# Patient Record
Sex: FEMALE | Race: OTHER | NOT HISPANIC OR LATINO
[De-identification: names, ages, dates, MRNs, and addresses within clinical notes are randomized per-mention and may not be internally consistent; named-entity substitution may affect disease eponyms.]

---

## 2021-10-13 ENCOUNTER — APPOINTMENT (OUTPATIENT)
Dept: SURGICAL ONCOLOGY | Facility: CLINIC | Age: 56
End: 2021-10-13
Payer: MEDICAID

## 2021-10-13 VITALS
HEIGHT: 62 IN | BODY MASS INDEX: 21.97 KG/M2 | DIASTOLIC BLOOD PRESSURE: 89 MMHG | OXYGEN SATURATION: 98 % | WEIGHT: 119.4 LBS | RESPIRATION RATE: 18 BRPM | SYSTOLIC BLOOD PRESSURE: 144 MMHG | HEART RATE: 78 BPM | TEMPERATURE: 98.2 F

## 2021-10-13 PROCEDURE — 99204 OFFICE O/P NEW MOD 45 MIN: CPT

## 2021-10-14 NOTE — ASSESSMENT
[FreeTextEntry1] : IMP: 55 year old female present with a enlarged heterogenous thyroid gland. Left thyroid biopsy- positive for malignancy. papillary thyroid carcinoma  \par \par PLAN: \par -CT neck to evaluate for cervical lymphadenopathy\par - Plan for total thyroidectomy given the presence of significant thyroid nodules on the contralateral side. pt prefers a total thyroidectomy and doesn’t want to consider a thyroid lobectomy.\par I have discussed the risks, benefits, alternatives, complications including but not limited bleeding, infection, damage to adjacent structures, nerve injury, hypoparathyroidism, sepsis, need for further procedures, sepsis, tumor recurrence to the patient in detail. Patient expressed verbal understanding. Written informed consent to be obtained in the preoperative period \par \par I have discussed the diagnosis, therapeutic plan and options with the patient at length. Patient expressed verbal understanding to proceed with proposed plan. All questions answered. \par

## 2021-10-14 NOTE — REASON FOR VISIT
[Initial Consultation] : an initial consultation for [Thyroid Nodule] : thyroid nodule [FreeTextEntry2] : papillary thyroid carcinoma

## 2021-10-14 NOTE — PHYSICAL EXAM
[Normal Neck Lymph Nodes] : normal neck lymph nodes  [Normal Supraclavicular Lymph Nodes] : normal supraclavicular lymph nodes [Normal Axillary Lymph Nodes] : normal axillary lymph nodes [Normal] : oriented to person, place and time, with appropriate affect [FreeTextEntry1] : COVID-19 precautions as per [facility] policy was universally followed  [de-identified] :  thyromegaly , left more than right

## 2021-10-14 NOTE — HISTORY OF PRESENT ILLNESS
[de-identified] : Ms. CARLOZ YING is a 55 year old female who present today for initial consultation for papillary thyroid carcinoma. Referred by: Dr. Daniel Yeung. \par Patient states she had gone for her annual physical exam and complaint to her PCP she had left neck pain and swelling and head pressure and PCP ordered a left neck US, which showed enlarged heterogenous thyroid gland. \par Past medical history: Menopause, Anemia(Thalassemia)  \par Family History: Breast cancer in sister at age 45\par Per patient had colonoscopy at age 49- per patient normal \par \par Thyroid US 9/4/21: Enlarged heterogenous thyroid gland with 3 noduled in each lobe largest right 1.1 cm and left 3 cm, TI-RADS 4.Biopsy of the dominant left-sided nodule is recommended and f/u \par \par Thyroid gland, left lobe, upper pole biopsy 10/8/21: Positive for malignancy. Papillary thyroid carcinoma. \par \par PCP: Dr. Daniel Yeung.

## 2021-10-14 NOTE — CONSULT LETTER
[Consult Letter:] : I had the pleasure of evaluating your patient, [unfilled]. [Please see my note below.] : Please see my note below. [Consult Closing:] : Thank you very much for allowing me to participate in the care of this patient.  If you have any questions, please do not hesitate to contact me. [Sincerely,] : Sincerely, [Dear  ___] : Dear  [unfilled], [( Thank you for referring [unfilled] for consultation for _____ )] : Thank you for referring [unfilled] for consultation for [unfilled] [FreeTextEntry3] : Markos Sotomayor MD, FICS, FACS\par , Surgical Oncology \par [facility] [place] [facility] \par [address]\[place] [address]\par \[place] [address]\par \par (mob) [phone]\par (o) [phone]\par (f) [phone]\par

## 2021-10-19 ENCOUNTER — RESULT REVIEW (OUTPATIENT)
Age: 56
End: 2021-10-19

## 2021-10-22 ENCOUNTER — OUTPATIENT (OUTPATIENT)
Dept: OUTPATIENT SERVICES | Facility: HOSPITAL | Age: 56
LOS: 1 days | End: 2021-10-22
Payer: MEDICAID

## 2021-10-22 ENCOUNTER — APPOINTMENT (OUTPATIENT)
Dept: CT IMAGING | Facility: CLINIC | Age: 56
End: 2021-10-22
Payer: MEDICAID

## 2021-10-22 DIAGNOSIS — C73 MALIGNANT NEOPLASM OF THYROID GLAND: ICD-10-CM

## 2021-10-22 PROCEDURE — 70491 CT SOFT TISSUE NECK W/DYE: CPT | Mod: 26

## 2021-10-22 PROCEDURE — 70491 CT SOFT TISSUE NECK W/DYE: CPT

## 2021-10-26 ENCOUNTER — TRANSCRIPTION ENCOUNTER (OUTPATIENT)
Age: 56
End: 2021-10-26

## 2021-10-27 ENCOUNTER — OUTPATIENT (OUTPATIENT)
Dept: OUTPATIENT SERVICES | Facility: HOSPITAL | Age: 56
LOS: 1 days | End: 2021-10-27
Payer: MEDICAID

## 2021-10-27 VITALS
HEART RATE: 66 BPM | HEIGHT: 63 IN | WEIGHT: 119.93 LBS | SYSTOLIC BLOOD PRESSURE: 124 MMHG | OXYGEN SATURATION: 100 % | RESPIRATION RATE: 16 BRPM | TEMPERATURE: 98 F | DIASTOLIC BLOOD PRESSURE: 73 MMHG

## 2021-10-27 DIAGNOSIS — Z01.818 ENCOUNTER FOR OTHER PREPROCEDURAL EXAMINATION: ICD-10-CM

## 2021-10-27 DIAGNOSIS — C73 MALIGNANT NEOPLASM OF THYROID GLAND: ICD-10-CM

## 2021-10-27 DIAGNOSIS — Z98.891 HISTORY OF UTERINE SCAR FROM PREVIOUS SURGERY: Chronic | ICD-10-CM

## 2021-10-27 LAB
ALBUMIN SERPL ELPH-MCNC: 4.1 G/DL — SIGNIFICANT CHANGE UP (ref 3.5–5)
ALP SERPL-CCNC: 61 U/L — SIGNIFICANT CHANGE UP (ref 40–120)
ALT FLD-CCNC: 25 U/L DA — SIGNIFICANT CHANGE UP (ref 10–60)
ANION GAP SERPL CALC-SCNC: 7 MMOL/L — SIGNIFICANT CHANGE UP (ref 5–17)
AST SERPL-CCNC: 15 U/L — SIGNIFICANT CHANGE UP (ref 10–40)
BILIRUB SERPL-MCNC: 1.1 MG/DL — SIGNIFICANT CHANGE UP (ref 0.2–1.2)
BLD GP AB SCN SERPL QL: SIGNIFICANT CHANGE UP
BUN SERPL-MCNC: 14 MG/DL — SIGNIFICANT CHANGE UP (ref 7–18)
CALCIUM SERPL-MCNC: 9.6 MG/DL — SIGNIFICANT CHANGE UP (ref 8.4–10.5)
CHLORIDE SERPL-SCNC: 105 MMOL/L — SIGNIFICANT CHANGE UP (ref 96–108)
CO2 SERPL-SCNC: 27 MMOL/L — SIGNIFICANT CHANGE UP (ref 22–31)
CREAT SERPL-MCNC: 0.71 MG/DL — SIGNIFICANT CHANGE UP (ref 0.5–1.3)
GLUCOSE SERPL-MCNC: 85 MG/DL — SIGNIFICANT CHANGE UP (ref 70–99)
HCT VFR BLD CALC: 36.6 % — SIGNIFICANT CHANGE UP (ref 34.5–45)
HGB BLD-MCNC: 11.4 G/DL — LOW (ref 11.5–15.5)
INR BLD: 1.18 RATIO — HIGH (ref 0.88–1.16)
MCHC RBC-ENTMCNC: 18.8 PG — LOW (ref 27–34)
MCHC RBC-ENTMCNC: 31.1 GM/DL — LOW (ref 32–36)
MCV RBC AUTO: 60.5 FL — LOW (ref 80–100)
NRBC # BLD: 0 /100 WBCS — SIGNIFICANT CHANGE UP (ref 0–0)
PLATELET # BLD AUTO: 172 K/UL — SIGNIFICANT CHANGE UP (ref 150–400)
POTASSIUM SERPL-MCNC: 4.1 MMOL/L — SIGNIFICANT CHANGE UP (ref 3.5–5.3)
POTASSIUM SERPL-SCNC: 4.1 MMOL/L — SIGNIFICANT CHANGE UP (ref 3.5–5.3)
PROT SERPL-MCNC: 8 G/DL — SIGNIFICANT CHANGE UP (ref 6–8.3)
PROTHROM AB SERPL-ACNC: 13.9 SEC — HIGH (ref 10.6–13.6)
RBC # BLD: 6.05 M/UL — HIGH (ref 3.8–5.2)
RBC # FLD: 16 % — HIGH (ref 10.3–14.5)
SODIUM SERPL-SCNC: 139 MMOL/L — SIGNIFICANT CHANGE UP (ref 135–145)
T4 AB SER-ACNC: 8.4 UG/DL — SIGNIFICANT CHANGE UP (ref 4.6–12)
TSH SERPL-MCNC: 1.28 UU/ML — SIGNIFICANT CHANGE UP (ref 0.34–4.82)
WBC # BLD: 5.05 K/UL — SIGNIFICANT CHANGE UP (ref 3.8–10.5)
WBC # FLD AUTO: 5.05 K/UL — SIGNIFICANT CHANGE UP (ref 3.8–10.5)

## 2021-10-27 PROCEDURE — G0463: CPT

## 2021-10-27 PROCEDURE — 84480 ASSAY TRIIODOTHYRONINE (T3): CPT

## 2021-10-27 PROCEDURE — 71046 X-RAY EXAM CHEST 2 VIEWS: CPT | Mod: 26

## 2021-10-27 PROCEDURE — 71046 X-RAY EXAM CHEST 2 VIEWS: CPT

## 2021-10-27 NOTE — H&P PST ADULT - MAMMOGRAM, RESULTS OF LAST, PROFILE
[General Appearance - Well Developed] : well developed [Normal Appearance] : normal appearance [Well Groomed] : well groomed [General Appearance - Well Nourished] : well nourished [No Deformities] : no deformities [General Appearance - In No Acute Distress] : no acute distress [Normal Conjunctiva] : the conjunctiva exhibited no abnormalities [Eyelids - No Xanthelasma] : the eyelids demonstrated no xanthelasmas [Normal Oral Mucosa] : normal oral mucosa [No Oral Pallor] : no oral pallor [No Oral Cyanosis] : no oral cyanosis [Normal Jugular Venous A Waves Present] : normal jugular venous A waves present [Normal Jugular Venous V Waves Present] : normal jugular venous V waves present [No Jugular Venous Almaguer A Waves] : no jugular venous almaguer A waves [Respiration, Rhythm And Depth] : normal respiratory rhythm and effort [Exaggerated Use Of Accessory Muscles For Inspiration] : no accessory muscle use [Auscultation Breath Sounds / Voice Sounds] : lungs were clear to auscultation bilaterally [Heart Rate And Rhythm] : heart rate and rhythm were normal [Heart Sounds] : normal S1 and S2 [Murmurs] : no murmurs present [Abdomen Soft] : soft [Abdomen Tenderness] : non-tender [Abdomen Mass (___ Cm)] : no abdominal mass palpated [Abnormal Walk] : normal gait [Gait - Sufficient For Exercise Testing] : the gait was sufficient for exercise testing [Nail Clubbing] : no clubbing of the fingernails [Cyanosis, Localized] : no localized cyanosis [Petechial Hemorrhages (___cm)] : no petechial hemorrhages [Skin Color & Pigmentation] : normal skin color and pigmentation [] : no rash [No Venous Stasis] : no venous stasis [Skin Lesions] : no skin lesions [No Skin Ulcers] : no skin ulcer [No Xanthoma] : no  xanthoma was observed [Oriented To Time, Place, And Person] : oriented to person, place, and time [Affect] : the affect was normal [Mood] : the mood was normal [No Anxiety] : not feeling anxious [Arterial Pulses Normal] : the arterial pulses were normal [Edema] : no peripheral edema present Negative for malignancy

## 2021-10-27 NOTE — H&P PST ADULT - NSICDXPASTSURGICALHX_GEN_ALL_CORE_FT
PAST SURGICAL HISTORY:    right foot surgery with hardware/Arthropathy      b/l shoulder surgery/Arthropathy Bilateral rotator cuff repair    History of

## 2021-10-27 NOTE — H&P PST ADULT - PROBLEM SELECTOR PLAN 1
Patient scheduled for total thyroidectomy on 11/15/2021  - Written and oral preoperative instructions given to patient with understanding verbalized.   - Instructions given to include using 4% chlorhexidine wash day of surgery.   - Maintaining NPO status post midnight day before surgery  - Stopping aspirin, NSAIDs, herbs, vitamins 7days before surgery   - Patient is to expect a phone call day before surgery between the hours of 430- 630pm giving arrival time for surgery day.  Preoperative labs drawn today - cbc, bmp, ptt, pt, inr, type/screen, thyroid panel. Results pending   CXR ordered to be done today - results pending. All testing to be faxed to PCP for preoperative medical optimization

## 2021-10-27 NOTE — H&P PST ADULT - NSICDXFAMILYHX_GEN_ALL_CORE_FT
FAMILY HISTORY:  Family history of breast cancer  Family history of diabetes mellitus  Family hx of hypertension

## 2021-10-27 NOTE — H&P PST ADULT - NSICDXPASTMEDICALHX_GEN_ALL_CORE_FT
PAST MEDICAL HISTORY:  2010  'uterine Cyst'     Anemia Thalassemia    Calcaneal fracture     History of rotator cuff tear bilateral

## 2021-10-27 NOTE — H&P PST ADULT - HISTORY OF PRESENT ILLNESS
55year old female with pmhx of Right calcaneal fracture, thalassemia and bilateral rotator cuff tear presents with c/o left side pain and stiffness when she was referred to have neck ultrasound revealing malignancy confirmed by biopsy. Patient is here today for presurgical testing for scheduled Total Thyroidectomy in 11/15/2021

## 2021-10-27 NOTE — H&P PST ADULT - NSANTHOSAYNRD_GEN_A_CORE
Patient denies hx/dx of ANITA/No. ANITA screening performed.  STOP BANG Legend: 0-2 = LOW Risk; 3-4 = INTERMEDIATE Risk; 5-8 = HIGH Risk

## 2021-10-28 ENCOUNTER — OUTPATIENT (OUTPATIENT)
Dept: OUTPATIENT SERVICES | Facility: HOSPITAL | Age: 56
LOS: 1 days | End: 2021-10-28

## 2021-10-28 DIAGNOSIS — Z98.891 HISTORY OF UTERINE SCAR FROM PREVIOUS SURGERY: Chronic | ICD-10-CM

## 2021-10-28 DIAGNOSIS — C73 MALIGNANT NEOPLASM OF THYROID GLAND: ICD-10-CM

## 2021-10-28 LAB — T3 SERPL-MCNC: 119 NG/DL — SIGNIFICANT CHANGE UP (ref 80–200)

## 2021-10-29 ENCOUNTER — RESULT REVIEW (OUTPATIENT)
Age: 56
End: 2021-10-29

## 2021-10-29 PROBLEM — Z87.39 PERSONAL HISTORY OF OTHER DISEASES OF THE MUSCULOSKELETAL SYSTEM AND CONNECTIVE TISSUE: Chronic | Status: ACTIVE | Noted: 2021-10-27

## 2021-10-29 PROBLEM — S92.009A UNSPECIFIED FRACTURE OF UNSPECIFIED CALCANEUS, INITIAL ENCOUNTER FOR CLOSED FRACTURE: Chronic | Status: ACTIVE | Noted: 2021-10-27

## 2021-11-06 DIAGNOSIS — Z01.818 ENCOUNTER FOR OTHER PREPROCEDURAL EXAMINATION: ICD-10-CM

## 2021-11-12 ENCOUNTER — APPOINTMENT (OUTPATIENT)
Dept: DISASTER EMERGENCY | Facility: CLINIC | Age: 56
End: 2021-11-12

## 2021-11-13 LAB — SARS-COV-2 N GENE NPH QL NAA+PROBE: NOT DETECTED

## 2021-11-14 ENCOUNTER — TRANSCRIPTION ENCOUNTER (OUTPATIENT)
Age: 56
End: 2021-11-14

## 2021-11-15 ENCOUNTER — APPOINTMENT (OUTPATIENT)
Dept: SURGICAL ONCOLOGY | Facility: HOSPITAL | Age: 56
End: 2021-11-15

## 2021-11-15 ENCOUNTER — RESULT REVIEW (OUTPATIENT)
Age: 56
End: 2021-11-15

## 2021-11-15 ENCOUNTER — INPATIENT (INPATIENT)
Facility: HOSPITAL | Age: 56
LOS: 0 days | Discharge: HOME | DRG: 627 | End: 2021-11-16
Attending: SURGERY | Admitting: SURGERY
Payer: MEDICAID

## 2021-11-15 ENCOUNTER — TRANSCRIPTION ENCOUNTER (OUTPATIENT)
Age: 56
End: 2021-11-15

## 2021-11-15 VITALS
HEIGHT: 63 IN | SYSTOLIC BLOOD PRESSURE: 137 MMHG | TEMPERATURE: 98 F | WEIGHT: 119.93 LBS | RESPIRATION RATE: 17 BRPM | HEART RATE: 77 BPM | DIASTOLIC BLOOD PRESSURE: 70 MMHG | OXYGEN SATURATION: 100 %

## 2021-11-15 DIAGNOSIS — Z01.818 ENCOUNTER FOR OTHER PREPROCEDURAL EXAMINATION: ICD-10-CM

## 2021-11-15 DIAGNOSIS — Z98.891 HISTORY OF UTERINE SCAR FROM PREVIOUS SURGERY: Chronic | ICD-10-CM

## 2021-11-15 DIAGNOSIS — C73 MALIGNANT NEOPLASM OF THYROID GLAND: ICD-10-CM

## 2021-11-15 LAB — BLD GP AB SCN SERPL QL: SIGNIFICANT CHANGE UP

## 2021-11-15 PROCEDURE — 60240 REMOVAL OF THYROID: CPT | Mod: 82

## 2021-11-15 PROCEDURE — 60240 REMOVAL OF THYROID: CPT

## 2021-11-15 PROCEDURE — 88305 TISSUE EXAM BY PATHOLOGIST: CPT | Mod: 26

## 2021-11-15 PROCEDURE — 88307 TISSUE EXAM BY PATHOLOGIST: CPT | Mod: 26

## 2021-11-15 RX ORDER — CALCITRIOL 0.5 UG/1
1 CAPSULE ORAL
Qty: 20 | Refills: 0
Start: 2021-11-15 | End: 2021-11-24

## 2021-11-15 RX ORDER — ONDANSETRON 8 MG/1
4 TABLET, FILM COATED ORAL EVERY 6 HOURS
Refills: 0 | Status: DISCONTINUED | OUTPATIENT
Start: 2021-11-15 | End: 2021-11-16

## 2021-11-15 RX ORDER — SODIUM CHLORIDE 9 MG/ML
1000 INJECTION, SOLUTION INTRAVENOUS
Refills: 0 | Status: DISCONTINUED | OUTPATIENT
Start: 2021-11-15 | End: 2021-11-15

## 2021-11-15 RX ORDER — METOCLOPRAMIDE HCL 10 MG
10 TABLET ORAL ONCE
Refills: 0 | Status: DISCONTINUED | OUTPATIENT
Start: 2021-11-15 | End: 2021-11-15

## 2021-11-15 RX ORDER — ACETAMINOPHEN 500 MG
1000 TABLET ORAL ONCE
Refills: 0 | Status: DISCONTINUED | OUTPATIENT
Start: 2021-11-15 | End: 2021-11-16

## 2021-11-15 RX ORDER — LEVOTHYROXINE SODIUM 125 MCG
1 TABLET ORAL
Qty: 30 | Refills: 2
Start: 2021-11-15 | End: 2022-02-12

## 2021-11-15 RX ORDER — ACETAMINOPHEN 500 MG
1000 TABLET ORAL ONCE
Refills: 0 | Status: DISCONTINUED | OUTPATIENT
Start: 2021-11-15 | End: 2021-11-15

## 2021-11-15 RX ORDER — ONDANSETRON 8 MG/1
4 TABLET, FILM COATED ORAL EVERY 6 HOURS
Refills: 0 | Status: DISCONTINUED | OUTPATIENT
Start: 2021-11-15 | End: 2021-11-15

## 2021-11-15 RX ORDER — SODIUM CHLORIDE 9 MG/ML
3 INJECTION INTRAMUSCULAR; INTRAVENOUS; SUBCUTANEOUS EVERY 8 HOURS
Refills: 0 | Status: DISCONTINUED | OUTPATIENT
Start: 2021-11-15 | End: 2021-11-15

## 2021-11-15 RX ORDER — CALCIUM ACETATE 667 MG
667 TABLET ORAL EVERY 12 HOURS
Refills: 0 | Status: DISCONTINUED | OUTPATIENT
Start: 2021-11-15 | End: 2021-11-16

## 2021-11-15 RX ORDER — SODIUM CHLORIDE 9 MG/ML
1000 INJECTION, SOLUTION INTRAVENOUS
Refills: 0 | Status: DISCONTINUED | OUTPATIENT
Start: 2021-11-15 | End: 2021-11-16

## 2021-11-15 RX ORDER — LEVOTHYROXINE SODIUM 125 MCG
100 TABLET ORAL DAILY
Refills: 0 | Status: DISCONTINUED | OUTPATIENT
Start: 2021-11-15 | End: 2021-11-15

## 2021-11-15 RX ORDER — LEVOTHYROXINE SODIUM 125 MCG
100 TABLET ORAL DAILY
Refills: 0 | Status: DISCONTINUED | OUTPATIENT
Start: 2021-11-15 | End: 2021-11-16

## 2021-11-15 RX ORDER — FENTANYL CITRATE 50 UG/ML
50 INJECTION INTRAVENOUS
Refills: 0 | Status: DISCONTINUED | OUTPATIENT
Start: 2021-11-15 | End: 2021-11-15

## 2021-11-15 RX ORDER — CALCITRIOL 0.5 UG/1
0.25 CAPSULE ORAL EVERY 12 HOURS
Refills: 0 | Status: DISCONTINUED | OUTPATIENT
Start: 2021-11-15 | End: 2021-11-16

## 2021-11-15 RX ORDER — FENTANYL CITRATE 50 UG/ML
25 INJECTION INTRAVENOUS
Refills: 0 | Status: DISCONTINUED | OUTPATIENT
Start: 2021-11-15 | End: 2021-11-15

## 2021-11-15 RX ORDER — ACETAMINOPHEN 500 MG
650 TABLET ORAL EVERY 6 HOURS
Refills: 0 | Status: DISCONTINUED | OUTPATIENT
Start: 2021-11-15 | End: 2021-11-16

## 2021-11-15 RX ORDER — CALCIUM ACETATE 667 MG
667 TABLET ORAL
Refills: 0 | Status: DISCONTINUED | OUTPATIENT
Start: 2021-11-15 | End: 2021-11-15

## 2021-11-15 RX ORDER — CALCITRIOL 0.5 UG/1
0.25 CAPSULE ORAL
Refills: 0 | Status: DISCONTINUED | OUTPATIENT
Start: 2021-11-15 | End: 2021-11-15

## 2021-11-15 RX ADMIN — Medication 667 MILLIGRAM(S): at 17:37

## 2021-11-15 RX ADMIN — Medication 650 MILLIGRAM(S): at 22:17

## 2021-11-15 RX ADMIN — Medication 650 MILLIGRAM(S): at 21:17

## 2021-11-15 RX ADMIN — Medication 100 MICROGRAM(S): at 17:38

## 2021-11-15 RX ADMIN — CALCITRIOL 0.25 MICROGRAM(S): 0.5 CAPSULE ORAL at 17:37

## 2021-11-15 NOTE — DISCHARGE NOTE PROVIDER - HOSPITAL COURSE
55 Y M PMH of right calcaneal fracture, thalassemia, and b/l rotator cuff tear admitted to the surgery service s/p total thyroidectomy for papillary thyroid cancer. Patient did well post operatively, pain well controlled and tolerating diet.

## 2021-11-15 NOTE — DISCHARGE NOTE PROVIDER - CARE PROVIDER_API CALL
Markos Maciel)  Surgery  [address], Division [place] [address]  Phone: [phone]  Fax: [phone]  Follow Up Time:

## 2021-11-15 NOTE — DISCHARGE NOTE PROVIDER - NSDCMRMEDTOKEN_GEN_ALL_CORE_FT
calcitriol 0.25 mcg oral capsule: 1 cap(s) orally 2 times a day   Calcium 600+D oral tablet: 1 tab(s) orally 2 times a day   levothyroxine 100 mcg (0.1 mg) oral tablet: 1 tab(s) orally once a day    acetaminophen 325 mg oral tablet: 2 tab(s) orally every 6 hours, As needed, Temp greater or equal to 38C (100.4F), Mild Pain (1 - 3)  calcitriol 0.25 mcg oral capsule: 1 cap(s) orally 2 times a day   Calcium 600+D oral tablet: 1 tab(s) orally 2 times a day   levothyroxine 100 mcg (0.1 mg) oral tablet: 1 tab(s) orally once a day

## 2021-11-15 NOTE — BRIEF OPERATIVE NOTE - OPERATION/FINDINGS
Normal thyroid anatomy, bilateral recurrent laryngeal nerves identified, bilateral superior and inferior parathyroid glands identified and preserved. One small normal appearing lymph node on right sent for specimen.

## 2021-11-15 NOTE — DISCHARGE NOTE PROVIDER - NSDCFUADDINST_GEN_ALL_CORE_FT
You had a total thyroidectomy performed. For pain take over the counter Tylenol ever 6 hours as needed.  You may shower, allow soap and water to rinse down your incision, do not scrub the area. The following medications were sent to your pharmacy, please take as prescribed:  Calcitriol 0.25 mcg every 12 hours for 10 days, Calcium 600 + D every 12 hours for 10 days, and synthroid 100 mcg daily.

## 2021-11-16 ENCOUNTER — TRANSCRIPTION ENCOUNTER (OUTPATIENT)
Age: 56
End: 2021-11-16

## 2021-11-16 VITALS
DIASTOLIC BLOOD PRESSURE: 65 MMHG | HEART RATE: 84 BPM | OXYGEN SATURATION: 100 % | SYSTOLIC BLOOD PRESSURE: 114 MMHG | TEMPERATURE: 99 F | RESPIRATION RATE: 16 BRPM

## 2021-11-16 LAB
COVID-19 NUCLEOCAPSID GAM AB INTERP: POSITIVE
COVID-19 NUCLEOCAPSID TOTAL GAM ANTIBODY RESULT: 144 INDEX — HIGH
COVID-19 SPIKE DOMAIN AB INTERP: POSITIVE
COVID-19 SPIKE DOMAIN ANTIBODY RESULT: >250 U/ML — HIGH
SARS-COV-2 IGG+IGM SERPL QL IA: 144 INDEX — HIGH
SARS-COV-2 IGG+IGM SERPL QL IA: >250 U/ML — HIGH
SARS-COV-2 IGG+IGM SERPL QL IA: POSITIVE
SARS-COV-2 IGG+IGM SERPL QL IA: POSITIVE

## 2021-11-16 PROCEDURE — 88307 TISSUE EXAM BY PATHOLOGIST: CPT

## 2021-11-16 PROCEDURE — 86901 BLOOD TYPING SEROLOGIC RH(D): CPT

## 2021-11-16 PROCEDURE — C1889: CPT

## 2021-11-16 PROCEDURE — 86769 SARS-COV-2 COVID-19 ANTIBODY: CPT

## 2021-11-16 PROCEDURE — 86900 BLOOD TYPING SEROLOGIC ABO: CPT

## 2021-11-16 PROCEDURE — 99238 HOSP IP/OBS DSCHRG MGMT 30/<: CPT | Mod: 24

## 2021-11-16 PROCEDURE — 88305 TISSUE EXAM BY PATHOLOGIST: CPT

## 2021-11-16 PROCEDURE — 86850 RBC ANTIBODY SCREEN: CPT

## 2021-11-16 PROCEDURE — 36415 COLL VENOUS BLD VENIPUNCTURE: CPT

## 2021-11-16 RX ORDER — ACETAMINOPHEN 500 MG
2 TABLET ORAL
Qty: 0 | Refills: 0 | DISCHARGE
Start: 2021-11-16

## 2021-11-16 RX ADMIN — CALCITRIOL 0.25 MICROGRAM(S): 0.5 CAPSULE ORAL at 05:45

## 2021-11-16 RX ADMIN — Medication 667 MILLIGRAM(S): at 05:45

## 2021-11-16 RX ADMIN — Medication 100 MICROGRAM(S): at 05:45

## 2021-11-16 NOTE — PROGRESS NOTE ADULT - SUBJECTIVE AND OBJECTIVE BOX
55 Y F PMH of right calcaneal fracture, thalassemia, and b/l rotator cuff tear admitted to the surgery service POD # 1 for  total thyroidectomy for papillary thyroid cancer.     No acute events overnight, patient has been hemodynamically stable. Has appropriate pain around surgical site. Denies any nausea or vomiting, tolerating diet. Pt. endorses she had some tingling in her calfs overnight but went away with placement of SCD. Pt. denies any persistent numbness/tingling/muscle spasms.      Vital Signs Last 24 Hrs  T(C): 36.7 (16 Nov 2021 06:28), Max: 37.1 (15 Nov 2021 23:36)  T(F): 98.1 (16 Nov 2021 06:28), Max: 98.8 (15 Nov 2021 23:36)  HR: 77 (16 Nov 2021 06:28) (68 - 100)  BP: 100/55 (16 Nov 2021 06:28) (99/45 - 119/71)  BP(mean): 83 (15 Nov 2021 12:30) (82 - 89)  RR: 16 (16 Nov 2021 06:28) (15 - 18)  SpO2: 100% (16 Nov 2021 06:28) (95% - 100%)    No acute distress, AAOX3   neck incisional site, healing well, dressing dry and intact, no swelling or erythema    non labored breathing, saturating well on room air   abd is soft, non tender, non distended, +BS   full ROM x 4       
Vital Signs Last 24 Hrs  T(C): 36.7 (16 Nov 2021 06:28), Max: 37.1 (15 Nov 2021 23:36)  T(F): 98.1 (16 Nov 2021 06:28), Max: 98.8 (15 Nov 2021 23:36)  HR: 77 (16 Nov 2021 06:28) (68 - 100)  BP: 100/55 (16 Nov 2021 06:28) (99/45 - 119/71)  BP(mean): 83 (15 Nov 2021 12:30) (82 - 89)  RR: 16 (16 Nov 2021 06:28) (15 - 18)  SpO2: 100% (16 Nov 2021 06:28) (95% - 100%)    I&O's Detail  incision clear  negative chvostek                      PLAN:  discharge home today on synthroid, rocaltrol and calcium        
Post op check      55 Y F PMH of right calcaneal fracture, thalassemia, and b/l rotator cuff tear admitted to the surgery service POD # 0 for  total thyroidectomy for papillary thyroid cancer. Patient doing well, complaining of some soreness in the throat, otherwise pain is well controlled, tolerating liquid diet, denies any nausea or vomiting.     Vital Signs Last 24 Hrs  T(C): 36.6 (15 Nov 2021 14:31), Max: 36.9 (15 Nov 2021 06:36)  T(F): 97.9 (15 Nov 2021 14:31), Max: 98.4 (15 Nov 2021 06:36)  HR: 94 (15 Nov 2021 14:31) (72 - 100)  BP: 112/73 (15 Nov 2021 14:31) (111/70 - 137/70)  BP(mean): 83 (15 Nov 2021 12:30) (82 - 89)  RR: 18 (15 Nov 2021 14:31) (15 - 18)  SpO2: 95% (15 Nov 2021 14:31) (95% - 100%)     No acute distress, AAOX3   Neck incisional surgical dressing dry and intact, raspy phonation   non labored breathing saturating well on room air  Full ROM x 4   skin well perfused      A/P:  55 Y F POD # 0 s/p total thyroidectomy   -advance to regular diet  -continue current pain regimen   -monitor for symptoms of hypocalcemia   -dispo likely tomorrow

## 2021-11-16 NOTE — DISCHARGE NOTE NURSING/CASE MANAGEMENT/SOCIAL WORK - PATIENT PORTAL LINK FT
You can access the FollowMyHealth Patient Portal offered by [facility] by registering at the following website: [email or web address]. By joining [employer]’s FollowMyHealth portal, you will also be able to view your health information using other applications (apps) compatible with our system.

## 2021-11-16 NOTE — PROGRESS NOTE ADULT - ASSESSMENT
55 Y F POD # 1 total thyroidectomy  -patient doing well post operatively  -continue current pain regiment  -OOBTC and ambulation   -incentive spirometry  -likely dispo home today

## 2021-12-01 ENCOUNTER — APPOINTMENT (OUTPATIENT)
Dept: SURGICAL ONCOLOGY | Facility: CLINIC | Age: 56
End: 2021-12-01
Payer: MEDICAID

## 2021-12-01 VITALS
SYSTOLIC BLOOD PRESSURE: 113 MMHG | HEART RATE: 71 BPM | OXYGEN SATURATION: 99 % | RESPIRATION RATE: 18 BRPM | WEIGHT: 121.8 LBS | TEMPERATURE: 98.3 F | BODY MASS INDEX: 22.28 KG/M2 | DIASTOLIC BLOOD PRESSURE: 75 MMHG

## 2021-12-01 PROCEDURE — 99024 POSTOP FOLLOW-UP VISIT: CPT

## 2021-12-01 RX ORDER — ASPIRIN 325 MG
600-400 TABLET, DELAYED RELEASE (ENTERIC COATED) ORAL
Qty: 60 | Refills: 0 | Status: ACTIVE | COMMUNITY
Start: 2021-12-01 | End: 1900-01-01

## 2021-12-01 RX ORDER — CALCITRIOL 0.25 UG/1
0.25 CAPSULE, LIQUID FILLED ORAL
Qty: 60 | Refills: 0 | Status: ACTIVE | COMMUNITY
Start: 2021-12-01 | End: 1900-01-01

## 2021-12-01 NOTE — HISTORY OF PRESENT ILLNESS
[de-identified] : Ms. CARLOZ YING is a 56 year old female who present today for post op s/p total thyroidectomy on 11/15/21. Final Path \par 1. Right cervical lymph node excision: 1 lymph node negative for metastatic carcinoma \par 2. Total thyroidectomy: Multifocal papillary thyroid carcinoma, mpT2, bilateral; 2 lesions: left lobe: papillary carcinoma, classic type, size 2.5 cm, located less than 1 mm from the anterior thyroidectomy margin; Right lobe: papillary carcinoma, follicular variant, infiltrative, size 0.5 cm, located less than 1 mm from the posterior thyroidectomy margin; One perithyroid lymph node, negative for metastatic carcinoma. Lymphovascular invasion is not identified. Two unremarkable left parathyroid glands are identified. Nonneoplastic thyroid gland shows Hashimoto's thyroiditis and multinodular goiter. \par 3. Cervical lymph node, level 6; excision: One lymph node, negative for metastatic carcinoma. pN0a \par \par \par Initial consultation for papillary thyroid carcinoma. Referred by: Dr. Daniel Yeung. \par Patient states she had gone for her annual physical exam and complaint to her PCP she had left neck pain and swelling and head pressure and PCP ordered a left neck US, which showed enlarged heterogenous thyroid gland. \par Past medical history: Menopause, Anemia(Thalassemia)  \par Family History: Breast cancer in sister at age 45\par Per patient had colonoscopy at age 49- per patient normal \par \par Thyroid US 9/4/21: Enlarged heterogenous thyroid gland with 3 noduled in each lobe largest right 1.1 cm and left 3 cm, TI-RADS 4.Biopsy of the dominant left-sided nodule is recommended and f/u \par \par Thyroid gland, left lobe, upper pole biopsy 10/8/21: Positive for malignancy. Papillary thyroid carcinoma. \par \par Today 12/1/21: patient is complaint of some harshness, and tingling in fingers. Denies fever, chill, or pain. Patient states is tolerating diet \par \par PCP: Dr. Daniel Yeung.

## 2021-12-01 NOTE — PHYSICAL EXAM
[Normal] : supple, no neck mass and thyroid not enlarged [Normal Neck Lymph Nodes] : normal neck lymph nodes  [Normal Supraclavicular Lymph Nodes] : normal supraclavicular lymph nodes [Normal Groin Lymph Nodes] : normal groin lymph nodes [Normal Axillary Lymph Nodes] : normal axillary lymph nodes [Normal] : oriented to person, place and time, with appropriate affect [FreeTextEntry1] : COVID-19 precautions as per [facility] policy was universally followed  [de-identified] : incision healing well with no evidence of infection, seroma or hematoma

## 2021-12-01 NOTE — ASSESSMENT
[FreeTextEntry1] : IMP: 55 year old female s/p total thyroidectomy for multifocal papillary thyroid carcinoma\par \par PLAN: \par - BMP, phos, and calcium ionized ordered now - pt complains of tingling of finger tips- ?transient hypoparathyroidism- Patient advised to continue to take calcitriol and calcium f\par - On synthroid- will need to be titrated accordingly\par - referred to  -endocrinologist to discuss HERNÁNDEZ\par - RTO 6 months  \par I have discussed the diagnosis, therapeutic plan and options with the patient at length. Patient expressed verbal understanding to proceed with proposed plan. All questions answered. \par

## 2021-12-01 NOTE — CONSULT LETTER
[Please see my note below.] : Please see my note below. [Consult Closing:] : Thank you very much for allowing me to participate in the care of this patient.  If you have any questions, please do not hesitate to contact me. [Sincerely,] : Sincerely, [Dear  ___] : Dear  [unfilled], [Consult Letter:] : I had the pleasure of evaluating your patient, [unfilled]. [( Thank you for referring [unfilled] for consultation for _____ )] : Thank you for referring [unfilled] for consultation for [unfilled] [FreeTextEntry2] : Daniel Yeung  [FreeTextEntry3] : Markos Sotomayor MD, FICS, FACS\par , Surgical Oncology \par [facility] [place] [facility] [employer] [facility] \par [address]\[place] [address]\par \[place] [address]\par \par (mob) [phone]\par (o) [phone]\par (f) [phone]\par   [DrCatherine  ___] : Dr. HAYES

## 2022-01-10 ENCOUNTER — OUTPATIENT (OUTPATIENT)
Dept: OUTPATIENT SERVICES | Facility: HOSPITAL | Age: 57
LOS: 1 days | End: 2022-01-10

## 2022-01-10 ENCOUNTER — APPOINTMENT (OUTPATIENT)
Dept: NUCLEAR MEDICINE | Facility: HOSPITAL | Age: 57
End: 2022-01-10
Payer: MEDICAID

## 2022-01-10 DIAGNOSIS — C73 MALIGNANT NEOPLASM OF THYROID GLAND: ICD-10-CM

## 2022-01-10 DIAGNOSIS — Z98.891 HISTORY OF UTERINE SCAR FROM PREVIOUS SURGERY: Chronic | ICD-10-CM

## 2022-01-11 ENCOUNTER — APPOINTMENT (OUTPATIENT)
Dept: NUCLEAR MEDICINE | Facility: HOSPITAL | Age: 57
End: 2022-01-11

## 2022-01-12 ENCOUNTER — APPOINTMENT (OUTPATIENT)
Dept: NUCLEAR MEDICINE | Facility: HOSPITAL | Age: 57
End: 2022-01-12

## 2022-01-12 PROCEDURE — 79005 NUCLEAR RX ORAL ADMIN: CPT | Mod: 26

## 2022-01-19 ENCOUNTER — OUTPATIENT (OUTPATIENT)
Dept: OUTPATIENT SERVICES | Facility: HOSPITAL | Age: 57
LOS: 1 days | End: 2022-01-19

## 2022-01-19 ENCOUNTER — APPOINTMENT (OUTPATIENT)
Dept: NUCLEAR MEDICINE | Facility: HOSPITAL | Age: 57
End: 2022-01-19
Payer: MEDICAID

## 2022-01-19 DIAGNOSIS — Z98.891 HISTORY OF UTERINE SCAR FROM PREVIOUS SURGERY: Chronic | ICD-10-CM

## 2022-01-19 DIAGNOSIS — C73 MALIGNANT NEOPLASM OF THYROID GLAND: ICD-10-CM

## 2022-01-19 PROCEDURE — 78018 THYROID MET IMAGING BODY: CPT | Mod: 26

## 2022-06-07 ENCOUNTER — APPOINTMENT (OUTPATIENT)
Dept: ULTRASOUND IMAGING | Facility: HOSPITAL | Age: 57
End: 2022-06-07
Payer: MEDICAID

## 2022-06-07 ENCOUNTER — OUTPATIENT (OUTPATIENT)
Dept: OUTPATIENT SERVICES | Facility: HOSPITAL | Age: 57
LOS: 1 days | End: 2022-06-07
Payer: MEDICAID

## 2022-06-07 ENCOUNTER — APPOINTMENT (OUTPATIENT)
Dept: MAMMOGRAPHY | Facility: HOSPITAL | Age: 57
End: 2022-06-07
Payer: MEDICAID

## 2022-06-07 DIAGNOSIS — Z98.891 HISTORY OF UTERINE SCAR FROM PREVIOUS SURGERY: Chronic | ICD-10-CM

## 2022-06-07 DIAGNOSIS — Z12.31 ENCOUNTER FOR SCREENING MAMMOGRAM FOR MALIGNANT NEOPLASM OF BREAST: ICD-10-CM

## 2022-06-07 PROCEDURE — 77063 BREAST TOMOSYNTHESIS BI: CPT

## 2022-06-07 PROCEDURE — 76641 ULTRASOUND BREAST COMPLETE: CPT | Mod: 26,50

## 2022-06-07 PROCEDURE — 77063 BREAST TOMOSYNTHESIS BI: CPT | Mod: 26

## 2022-06-07 PROCEDURE — 77067 SCR MAMMO BI INCL CAD: CPT | Mod: 26

## 2022-06-07 PROCEDURE — 76641 ULTRASOUND BREAST COMPLETE: CPT

## 2022-06-07 PROCEDURE — 77067 SCR MAMMO BI INCL CAD: CPT

## 2022-08-09 ENCOUNTER — RESULT REVIEW (OUTPATIENT)
Age: 57
End: 2022-08-09

## 2022-08-09 ENCOUNTER — APPOINTMENT (OUTPATIENT)
Dept: MAMMOGRAPHY | Facility: HOSPITAL | Age: 57
End: 2022-08-09

## 2022-08-09 ENCOUNTER — OUTPATIENT (OUTPATIENT)
Dept: OUTPATIENT SERVICES | Facility: HOSPITAL | Age: 57
LOS: 1 days | End: 2022-08-09
Payer: MEDICAID

## 2022-08-09 DIAGNOSIS — D24.2 BENIGN NEOPLASM OF LEFT BREAST: ICD-10-CM

## 2022-08-09 DIAGNOSIS — Z98.891 HISTORY OF UTERINE SCAR FROM PREVIOUS SURGERY: Chronic | ICD-10-CM

## 2022-08-09 DIAGNOSIS — R92.8 OTHER ABNORMAL AND INCONCLUSIVE FINDINGS ON DIAGNOSTIC IMAGING OF BREAST: ICD-10-CM

## 2022-08-09 PROCEDURE — 76642 ULTRASOUND BREAST LIMITED: CPT | Mod: 26,RT

## 2022-08-09 PROCEDURE — 77065 DX MAMMO INCL CAD UNI: CPT

## 2022-08-09 PROCEDURE — G0279: CPT

## 2022-08-09 PROCEDURE — 76642 ULTRASOUND BREAST LIMITED: CPT

## 2022-08-09 PROCEDURE — 77065 DX MAMMO INCL CAD UNI: CPT | Mod: 26,RT

## 2022-08-09 PROCEDURE — G0279: CPT | Mod: 26

## 2022-08-20 ENCOUNTER — RESULT REVIEW (OUTPATIENT)
Age: 57
End: 2022-08-20

## 2022-08-21 ENCOUNTER — INPATIENT (INPATIENT)
Facility: HOSPITAL | Age: 57
LOS: 0 days | Discharge: HOME | DRG: 343 | End: 2022-08-22
Attending: STUDENT IN AN ORGANIZED HEALTH CARE EDUCATION/TRAINING PROGRAM | Admitting: STUDENT IN AN ORGANIZED HEALTH CARE EDUCATION/TRAINING PROGRAM
Payer: MEDICAID

## 2022-08-21 ENCOUNTER — TRANSCRIPTION ENCOUNTER (OUTPATIENT)
Age: 57
End: 2022-08-21

## 2022-08-21 VITALS
HEART RATE: 62 BPM | RESPIRATION RATE: 16 BRPM | DIASTOLIC BLOOD PRESSURE: 75 MMHG | OXYGEN SATURATION: 99 % | SYSTOLIC BLOOD PRESSURE: 125 MMHG | TEMPERATURE: 98 F | HEIGHT: 63 IN | WEIGHT: 117.95 LBS

## 2022-08-21 DIAGNOSIS — E89.0 POSTPROCEDURAL HYPOTHYROIDISM: Chronic | ICD-10-CM

## 2022-08-21 DIAGNOSIS — K37 UNSPECIFIED APPENDICITIS: ICD-10-CM

## 2022-08-21 DIAGNOSIS — Z98.891 HISTORY OF UTERINE SCAR FROM PREVIOUS SURGERY: Chronic | ICD-10-CM

## 2022-08-21 LAB
ALBUMIN SERPL ELPH-MCNC: 3.8 G/DL — SIGNIFICANT CHANGE UP (ref 3.5–5)
ALP SERPL-CCNC: 40 U/L — SIGNIFICANT CHANGE UP (ref 40–120)
ALT FLD-CCNC: 31 U/L DA — SIGNIFICANT CHANGE UP (ref 10–60)
ANION GAP SERPL CALC-SCNC: 7 MMOL/L — SIGNIFICANT CHANGE UP (ref 5–17)
APPEARANCE UR: CLEAR — SIGNIFICANT CHANGE UP
APTT BLD: 29.7 SEC — SIGNIFICANT CHANGE UP (ref 27.5–35.5)
AST SERPL-CCNC: 37 U/L — SIGNIFICANT CHANGE UP (ref 10–40)
BACTERIA # UR AUTO: ABNORMAL /HPF
BASOPHILS # BLD AUTO: 0.02 K/UL — SIGNIFICANT CHANGE UP (ref 0–0.2)
BASOPHILS NFR BLD AUTO: 0.2 % — SIGNIFICANT CHANGE UP (ref 0–2)
BILIRUB SERPL-MCNC: 2.1 MG/DL — HIGH (ref 0.2–1.2)
BILIRUB UR-MCNC: NEGATIVE — SIGNIFICANT CHANGE UP
BLD GP AB SCN SERPL QL: SIGNIFICANT CHANGE UP
BUN SERPL-MCNC: 12 MG/DL — SIGNIFICANT CHANGE UP (ref 7–18)
CALCIUM SERPL-MCNC: 7.7 MG/DL — LOW (ref 8.4–10.5)
CHLORIDE SERPL-SCNC: 103 MMOL/L — SIGNIFICANT CHANGE UP (ref 96–108)
CO2 SERPL-SCNC: 26 MMOL/L — SIGNIFICANT CHANGE UP (ref 22–31)
COLOR SPEC: YELLOW — SIGNIFICANT CHANGE UP
CREAT SERPL-MCNC: 0.79 MG/DL — SIGNIFICANT CHANGE UP (ref 0.5–1.3)
DIFF PNL FLD: NEGATIVE — SIGNIFICANT CHANGE UP
EGFR: 87 ML/MIN/1.73M2 — SIGNIFICANT CHANGE UP
EOSINOPHIL # BLD AUTO: 0.01 K/UL — SIGNIFICANT CHANGE UP (ref 0–0.5)
EOSINOPHIL NFR BLD AUTO: 0.1 % — SIGNIFICANT CHANGE UP (ref 0–6)
EPI CELLS # UR: SIGNIFICANT CHANGE UP /HPF
GLUCOSE SERPL-MCNC: 95 MG/DL — SIGNIFICANT CHANGE UP (ref 70–99)
GLUCOSE UR QL: NEGATIVE — SIGNIFICANT CHANGE UP
HCG UR QL: NEGATIVE — SIGNIFICANT CHANGE UP
HCT VFR BLD CALC: 34.8 % — SIGNIFICANT CHANGE UP (ref 34.5–45)
HGB BLD-MCNC: 11 G/DL — LOW (ref 11.5–15.5)
IMM GRANULOCYTES NFR BLD AUTO: 0.3 % — SIGNIFICANT CHANGE UP (ref 0–1.5)
INR BLD: 1.14 RATIO — SIGNIFICANT CHANGE UP (ref 0.88–1.16)
KETONES UR-MCNC: ABNORMAL
LEUKOCYTE ESTERASE UR-ACNC: ABNORMAL
LIDOCAIN IGE QN: 275 U/L — SIGNIFICANT CHANGE UP (ref 73–393)
LYMPHOCYTES # BLD AUTO: 0.67 K/UL — LOW (ref 1–3.3)
LYMPHOCYTES # BLD AUTO: 7 % — LOW (ref 13–44)
MAGNESIUM SERPL-MCNC: 2.1 MG/DL — SIGNIFICANT CHANGE UP (ref 1.6–2.6)
MCHC RBC-ENTMCNC: 19.5 PG — LOW (ref 27–34)
MCHC RBC-ENTMCNC: 31.6 GM/DL — LOW (ref 32–36)
MCV RBC AUTO: 61.8 FL — LOW (ref 80–100)
MONOCYTES # BLD AUTO: 0.44 K/UL — SIGNIFICANT CHANGE UP (ref 0–0.9)
MONOCYTES NFR BLD AUTO: 4.6 % — SIGNIFICANT CHANGE UP (ref 2–14)
NEUTROPHILS # BLD AUTO: 8.43 K/UL — HIGH (ref 1.8–7.4)
NEUTROPHILS NFR BLD AUTO: 87.8 % — HIGH (ref 43–77)
NITRITE UR-MCNC: NEGATIVE — SIGNIFICANT CHANGE UP
NRBC # BLD: 0 /100 WBCS — SIGNIFICANT CHANGE UP (ref 0–0)
PH UR: 6 — SIGNIFICANT CHANGE UP (ref 5–8)
PLATELET # BLD AUTO: 135 K/UL — LOW (ref 150–400)
POTASSIUM SERPL-MCNC: 4.9 MMOL/L — SIGNIFICANT CHANGE UP (ref 3.5–5.3)
POTASSIUM SERPL-SCNC: 4.9 MMOL/L — SIGNIFICANT CHANGE UP (ref 3.5–5.3)
PROT SERPL-MCNC: 7.5 G/DL — SIGNIFICANT CHANGE UP (ref 6–8.3)
PROT UR-MCNC: NEGATIVE — SIGNIFICANT CHANGE UP
PROTHROM AB SERPL-ACNC: 13.6 SEC — HIGH (ref 10.5–13.4)
RBC # BLD: 5.63 M/UL — HIGH (ref 3.8–5.2)
RBC # FLD: 16.1 % — HIGH (ref 10.3–14.5)
RBC CASTS # UR COMP ASSIST: ABNORMAL /HPF (ref 0–2)
SARS-COV-2 RNA SPEC QL NAA+PROBE: SIGNIFICANT CHANGE UP
SODIUM SERPL-SCNC: 136 MMOL/L — SIGNIFICANT CHANGE UP (ref 135–145)
SP GR SPEC: 1.01 — SIGNIFICANT CHANGE UP (ref 1.01–1.02)
UROBILINOGEN FLD QL: NEGATIVE — SIGNIFICANT CHANGE UP
WBC # BLD: 9.6 K/UL — SIGNIFICANT CHANGE UP (ref 3.8–10.5)
WBC # FLD AUTO: 9.6 K/UL — SIGNIFICANT CHANGE UP (ref 3.8–10.5)
WBC UR QL: SIGNIFICANT CHANGE UP /HPF (ref 0–5)

## 2022-08-21 PROCEDURE — 99285 EMERGENCY DEPT VISIT HI MDM: CPT

## 2022-08-21 PROCEDURE — 88304 TISSUE EXAM BY PATHOLOGIST: CPT | Mod: 26

## 2022-08-21 PROCEDURE — 99221 1ST HOSP IP/OBS SF/LOW 40: CPT | Mod: 57

## 2022-08-21 PROCEDURE — 44970 LAPAROSCOPY APPENDECTOMY: CPT | Mod: AS

## 2022-08-21 PROCEDURE — 74177 CT ABD & PELVIS W/CONTRAST: CPT | Mod: 26,MA

## 2022-08-21 PROCEDURE — 44970 LAPAROSCOPY APPENDECTOMY: CPT

## 2022-08-21 DEVICE — STAPLER COVIDIEN TRI-STAPLE 45MM TAN RELOAD: Type: IMPLANTABLE DEVICE | Status: FUNCTIONAL

## 2022-08-21 RX ORDER — HEPARIN SODIUM 5000 [USP'U]/ML
5000 INJECTION INTRAVENOUS; SUBCUTANEOUS EVERY 8 HOURS
Refills: 0 | Status: DISCONTINUED | OUTPATIENT
Start: 2022-08-21 | End: 2022-08-22

## 2022-08-21 RX ORDER — ONDANSETRON 8 MG/1
4 TABLET, FILM COATED ORAL EVERY 6 HOURS
Refills: 0 | Status: DISCONTINUED | OUTPATIENT
Start: 2022-08-21 | End: 2022-08-22

## 2022-08-21 RX ORDER — ONDANSETRON 8 MG/1
4 TABLET, FILM COATED ORAL ONCE
Refills: 0 | Status: COMPLETED | OUTPATIENT
Start: 2022-08-21 | End: 2022-08-21

## 2022-08-21 RX ORDER — KETOROLAC TROMETHAMINE 30 MG/ML
30 SYRINGE (ML) INJECTION ONCE
Refills: 0 | Status: DISCONTINUED | OUTPATIENT
Start: 2022-08-21 | End: 2022-08-21

## 2022-08-21 RX ORDER — ACETAMINOPHEN 500 MG
1000 TABLET ORAL EVERY 6 HOURS
Refills: 0 | Status: DISCONTINUED | OUTPATIENT
Start: 2022-08-21 | End: 2022-08-22

## 2022-08-21 RX ORDER — SODIUM CHLORIDE 9 MG/ML
1000 INJECTION INTRAMUSCULAR; INTRAVENOUS; SUBCUTANEOUS ONCE
Refills: 0 | Status: COMPLETED | OUTPATIENT
Start: 2022-08-21 | End: 2022-08-21

## 2022-08-21 RX ORDER — SODIUM CHLORIDE 9 MG/ML
1000 INJECTION, SOLUTION INTRAVENOUS
Refills: 0 | Status: DISCONTINUED | OUTPATIENT
Start: 2022-08-21 | End: 2022-08-22

## 2022-08-21 RX ORDER — LEVOTHYROXINE SODIUM 125 MCG
50 TABLET ORAL DAILY
Refills: 0 | Status: DISCONTINUED | OUTPATIENT
Start: 2022-08-21 | End: 2022-08-22

## 2022-08-21 RX ADMIN — ONDANSETRON 4 MILLIGRAM(S): 8 TABLET, FILM COATED ORAL at 14:53

## 2022-08-21 RX ADMIN — SODIUM CHLORIDE 100 MILLILITER(S): 9 INJECTION, SOLUTION INTRAVENOUS at 19:18

## 2022-08-21 RX ADMIN — Medication 30 MILLIGRAM(S): at 15:57

## 2022-08-21 RX ADMIN — SODIUM CHLORIDE 1000 MILLILITER(S): 9 INJECTION INTRAMUSCULAR; INTRAVENOUS; SUBCUTANEOUS at 14:52

## 2022-08-21 RX ADMIN — Medication 30 MILLIGRAM(S): at 15:23

## 2022-08-21 NOTE — ED ADULT NURSE REASSESSMENT NOTE - NS ED NURSE REASSESS COMMENT FT1
Patient admitted to surgery, labs drawn, pain managed by medications as ordered, IV intact no redness or swelling noted

## 2022-08-21 NOTE — PATIENT PROFILE ADULT - FALL HARM RISK - UNIVERSAL INTERVENTIONS
Bed in lowest position, wheels locked, appropriate side rails in place/Call bell, personal items and telephone in reach/Instruct patient to call for assistance before getting out of bed or chair/Non-slip footwear when patient is out of bed/[place] to call system/Physically safe environment - no spills, clutter or unnecessary equipment/Purposeful Proactive Rounding/Room/bathroom lighting operational, light cord in reach

## 2022-08-21 NOTE — ED PROVIDER NOTE - PHYSICAL EXAMINATION
General: well appearing female, no acute distress   HEENT: normocephalic, atraumatic   Respiratory: normal work of breathing   Abdomen: soft, lower abdominal tenderness to palpation   MSK: no swelling or tenderness of lower extremities, moving all extremities spontaneously   Skin: warm, dry   Neuro: A&Ox3  Psych: appropriate affect

## 2022-08-21 NOTE — H&P ADULT - NSICDXPASTSURGICALHX_GEN_ALL_CORE_FT
PAST SURGICAL HISTORY:    right foot surgery with hardware/Arthropathy      b/l shoulder surgery/Arthropathy Bilateral rotator cuff repair    H/O total thyroidectomy     History of

## 2022-08-21 NOTE — H&P ADULT - NS ATTEND AMEND GEN_ALL_CORE FT
Pt w/ RLQ pain starting today and nausea w/o emesis  CT consistent w/ acute non-perforated appendicitis    abd soft, non-distended, RLQ point TTP and guarding    - admit  - NPO and IVFs  - IV abx  - OR today for lap salud Garvey MD  Attending Physician

## 2022-08-21 NOTE — ED PROVIDER NOTE - CLINICAL SUMMARY MEDICAL DECISION MAKING FREE TEXT BOX
[facility] 56F presenting with abdominal pain. tender on exam. will get labs, CT abdomen. will reassess.

## 2022-08-21 NOTE — H&P ADULT - HISTORY OF PRESENT ILLNESS
55 yo F with PMH papillary thyroid carcinoma s/p total thyroidectomy, radioactive iodine 2022, PSH  c/o abdominal pain that began today. Pt states pain started in mid upper abdomen, then mid abdomen and is now in right lower quadrant.  Pt admits to nausea and diarrhea.  She denies fever, chills or vomiting.  Pt admits to voiding after CT scan. Had colonoscopy at 49 years old.  55 yo F with PMH papillary thyroid carcinoma s/p total thyroidectomy, radioactive iodine 2022, Thalassemia anemia on FeSO4,  PSH  c/o abdominal pain that began today. Pt states pain started in mid upper abdomen, then mid abdomen and is now in right lower quadrant.  Pt admits to nausea and diarrhea.  She denies fever, chills or vomiting.  Pt admits to voiding after CT scan. Had colonoscopy at 49 years old.

## 2022-08-21 NOTE — ED PROVIDER NOTE - NSFOLLOWUPINSTRUCTIONS_ED_ALL_ED_FT
You were seen in the emergency department for abdominal pain.     Please follow-up with your primary care doctor in the next 24-48 hours.     If you have any worsening symptoms, severe headache, chest pain, shortness of breath, abdominal pain, or you are unable to tolerate food or fluids, please return to the emergency department.

## 2022-08-21 NOTE — H&P ADULT - ASSESSMENT
57yo F with acute appendicitis  1. Admit to surgery  2. NPO  3. IV fluids  4. IV antibiotics  5. Pain control  6. DVT prophylaxis

## 2022-08-21 NOTE — H&P ADULT - NSICDXPASTMEDICALHX_GEN_ALL_CORE_FT
PAST MEDICAL HISTORY:  2010  'uterine Cyst'     Anemia Thalassemia    Calcaneal fracture     History of rotator cuff tear bilateral    Papillary thyroid carcinoma

## 2022-08-21 NOTE — ED PROVIDER NOTE - OBJECTIVE STATEMENT
56F, no significant pmh, presenting with abdominal pain. pain began approximately 3 hours prior ot arrival and has been worsening. has nausea and diarrhea. no headache, chest pain, shortness of breath, pain or burning with urination. no similar symptoms in the past.

## 2022-08-21 NOTE — H&P ADULT - GENERAL
Airway  Performed by: Roosevelt Bradley MD  Authorized by: Roosevelt Bradley MD     Final Airway Type:  Endotracheal airway  Final Endotracheal Airway*:  ETT  ETT Size (mm)*:  7.0  Cuff*:  Regular  Technique Used for Successful ETT Placement:  Video laryngoscopy  Devices/Methods Used in Placement*:  Mask  Intubation Procedure*:  Preoxygenation, ETCO2, Atraumatic, Dentition Unchanged and Pharynx Clear  Insertion Site:  Oral  Blade Type*:  MAC  Blade Size*:  3  Cuff Volume (mL):  8  Measured from*:  Teeth  Secured at (cm)*:  21  Placement Verified by: auscultation and capnometry    Glottic View*:  1 - full view of glottis  Attempts*:  1   Patient Identified, Procedure confirmed, Emergency equipment available and Safety protocols followed  Location:  OR  Urgency:  Elective  Difficult Airway: No    Indications for Airway Management:  Anesthesia  Mask Difficulty Assessment:  1 - vent by mask  Performed By:  Anesthesiologist  Anesthesiologist:  Roosevelt Bradley MD  Start Time: 3/4/2022 11:20 AM        
details…

## 2022-08-22 ENCOUNTER — TRANSCRIPTION ENCOUNTER (OUTPATIENT)
Age: 57
End: 2022-08-22

## 2022-08-22 VITALS
DIASTOLIC BLOOD PRESSURE: 55 MMHG | HEART RATE: 70 BPM | OXYGEN SATURATION: 99 % | RESPIRATION RATE: 16 BRPM | SYSTOLIC BLOOD PRESSURE: 94 MMHG | TEMPERATURE: 98 F

## 2022-08-22 LAB
ANION GAP SERPL CALC-SCNC: 7 MMOL/L — SIGNIFICANT CHANGE UP (ref 5–17)
BASOPHILS # BLD AUTO: 0.01 K/UL — SIGNIFICANT CHANGE UP (ref 0–0.2)
BASOPHILS NFR BLD AUTO: 0.1 % — SIGNIFICANT CHANGE UP (ref 0–2)
BUN SERPL-MCNC: 8 MG/DL — SIGNIFICANT CHANGE UP (ref 7–18)
CALCIUM SERPL-MCNC: 6.7 MG/DL — LOW (ref 8.4–10.5)
CHLORIDE SERPL-SCNC: 108 MMOL/L — SIGNIFICANT CHANGE UP (ref 96–108)
CO2 SERPL-SCNC: 28 MMOL/L — SIGNIFICANT CHANGE UP (ref 22–31)
CREAT SERPL-MCNC: 1.07 MG/DL — SIGNIFICANT CHANGE UP (ref 0.5–1.3)
CULTURE RESULTS: NO GROWTH — SIGNIFICANT CHANGE UP
EGFR: 61 ML/MIN/1.73M2 — SIGNIFICANT CHANGE UP
EOSINOPHIL # BLD AUTO: 0 K/UL — SIGNIFICANT CHANGE UP (ref 0–0.5)
EOSINOPHIL NFR BLD AUTO: 0 % — SIGNIFICANT CHANGE UP (ref 0–6)
GLUCOSE SERPL-MCNC: 132 MG/DL — HIGH (ref 70–99)
HCT VFR BLD CALC: 26.2 % — LOW (ref 34.5–45)
HGB BLD-MCNC: 8.4 G/DL — LOW (ref 11.5–15.5)
IMM GRANULOCYTES NFR BLD AUTO: 0.5 % — SIGNIFICANT CHANGE UP (ref 0–1.5)
LYMPHOCYTES # BLD AUTO: 1.02 K/UL — SIGNIFICANT CHANGE UP (ref 1–3.3)
LYMPHOCYTES # BLD AUTO: 11.8 % — LOW (ref 13–44)
MCHC RBC-ENTMCNC: 19 PG — LOW (ref 27–34)
MCHC RBC-ENTMCNC: 32.1 GM/DL — SIGNIFICANT CHANGE UP (ref 32–36)
MCV RBC AUTO: 59.3 FL — LOW (ref 80–100)
MONOCYTES # BLD AUTO: 0.44 K/UL — SIGNIFICANT CHANGE UP (ref 0–0.9)
MONOCYTES NFR BLD AUTO: 5.1 % — SIGNIFICANT CHANGE UP (ref 2–14)
NEUTROPHILS # BLD AUTO: 7.11 K/UL — SIGNIFICANT CHANGE UP (ref 1.8–7.4)
NEUTROPHILS NFR BLD AUTO: 82.5 % — HIGH (ref 43–77)
NRBC # BLD: 0 /100 WBCS — SIGNIFICANT CHANGE UP (ref 0–0)
PLATELET # BLD AUTO: 112 K/UL — LOW (ref 150–400)
POTASSIUM SERPL-MCNC: 3.8 MMOL/L — SIGNIFICANT CHANGE UP (ref 3.5–5.3)
POTASSIUM SERPL-SCNC: 3.8 MMOL/L — SIGNIFICANT CHANGE UP (ref 3.5–5.3)
RBC # BLD: 4.42 M/UL — SIGNIFICANT CHANGE UP (ref 3.8–5.2)
RBC # FLD: 16 % — HIGH (ref 10.3–14.5)
SODIUM SERPL-SCNC: 143 MMOL/L — SIGNIFICANT CHANGE UP (ref 135–145)
SPECIMEN SOURCE: SIGNIFICANT CHANGE UP
WBC # BLD: 8.62 K/UL — SIGNIFICANT CHANGE UP (ref 3.8–10.5)
WBC # FLD AUTO: 8.62 K/UL — SIGNIFICANT CHANGE UP (ref 3.8–10.5)

## 2022-08-22 PROCEDURE — 80053 COMPREHEN METABOLIC PANEL: CPT

## 2022-08-22 PROCEDURE — 88304 TISSUE EXAM BY PATHOLOGIST: CPT

## 2022-08-22 PROCEDURE — 96374 THER/PROPH/DIAG INJ IV PUSH: CPT

## 2022-08-22 PROCEDURE — 86901 BLOOD TYPING SEROLOGIC RH(D): CPT

## 2022-08-22 PROCEDURE — 96375 TX/PRO/DX INJ NEW DRUG ADDON: CPT

## 2022-08-22 PROCEDURE — 85610 PROTHROMBIN TIME: CPT

## 2022-08-22 PROCEDURE — 81001 URINALYSIS AUTO W/SCOPE: CPT

## 2022-08-22 PROCEDURE — 83690 ASSAY OF LIPASE: CPT

## 2022-08-22 PROCEDURE — 86900 BLOOD TYPING SEROLOGIC ABO: CPT

## 2022-08-22 PROCEDURE — 81025 URINE PREGNANCY TEST: CPT

## 2022-08-22 PROCEDURE — 36415 COLL VENOUS BLD VENIPUNCTURE: CPT

## 2022-08-22 PROCEDURE — C1889: CPT

## 2022-08-22 PROCEDURE — 80048 BASIC METABOLIC PNL TOTAL CA: CPT

## 2022-08-22 PROCEDURE — 74177 CT ABD & PELVIS W/CONTRAST: CPT | Mod: MA

## 2022-08-22 PROCEDURE — 85025 COMPLETE CBC W/AUTO DIFF WBC: CPT

## 2022-08-22 PROCEDURE — 87086 URINE CULTURE/COLONY COUNT: CPT

## 2022-08-22 PROCEDURE — 85730 THROMBOPLASTIN TIME PARTIAL: CPT

## 2022-08-22 PROCEDURE — 99285 EMERGENCY DEPT VISIT HI MDM: CPT

## 2022-08-22 PROCEDURE — 87635 SARS-COV-2 COVID-19 AMP PRB: CPT

## 2022-08-22 PROCEDURE — 86850 RBC ANTIBODY SCREEN: CPT

## 2022-08-22 PROCEDURE — 83735 ASSAY OF MAGNESIUM: CPT

## 2022-08-22 RX ORDER — SODIUM CHLORIDE 9 MG/ML
1000 INJECTION, SOLUTION INTRAVENOUS
Refills: 0 | Status: DISCONTINUED | OUTPATIENT
Start: 2022-08-22 | End: 2022-08-22

## 2022-08-22 RX ORDER — ACETAMINOPHEN 500 MG
2 TABLET ORAL
Qty: 16 | Refills: 0
Start: 2022-08-22 | End: 2022-08-23

## 2022-08-22 RX ORDER — IBUPROFEN 200 MG
600 TABLET ORAL ONCE
Refills: 0 | Status: DISCONTINUED | OUTPATIENT
Start: 2022-08-22 | End: 2022-08-22

## 2022-08-22 RX ORDER — ONDANSETRON 8 MG/1
4 TABLET, FILM COATED ORAL ONCE
Refills: 0 | Status: DISCONTINUED | OUTPATIENT
Start: 2022-08-22 | End: 2022-08-22

## 2022-08-22 RX ORDER — IBUPROFEN 200 MG
600 TABLET ORAL ONCE
Refills: 0 | Status: COMPLETED | OUTPATIENT
Start: 2022-08-22 | End: 2022-08-22

## 2022-08-22 RX ORDER — FENTANYL CITRATE 50 UG/ML
25 INJECTION INTRAVENOUS
Refills: 0 | Status: DISCONTINUED | OUTPATIENT
Start: 2022-08-22 | End: 2022-08-22

## 2022-08-22 RX ORDER — ACETAMINOPHEN 500 MG
1000 TABLET ORAL ONCE
Refills: 0 | Status: COMPLETED | OUTPATIENT
Start: 2022-08-22 | End: 2022-08-22

## 2022-08-22 RX ORDER — ACETAMINOPHEN 500 MG
1000 TABLET ORAL ONCE
Refills: 0 | Status: DISCONTINUED | OUTPATIENT
Start: 2022-08-22 | End: 2022-08-22

## 2022-08-22 RX ADMIN — Medication 50 MICROGRAM(S): at 08:07

## 2022-08-22 RX ADMIN — Medication 600 MILLIGRAM(S): at 09:51

## 2022-08-22 RX ADMIN — Medication 600 MILLIGRAM(S): at 09:09

## 2022-08-22 NOTE — DISCHARGE NOTE NURSING/CASE MANAGEMENT/SOCIAL WORK - PATIENT PORTAL LINK FT
You can access the FollowMyHealth Patient Portal offered by [facility] by registering at the following website: [email or web address]. By joining [employer]’s FollowMyHealth portal, you will also be able to view your health information using other applications (apps) compatible with our system.

## 2022-08-22 NOTE — DISCHARGE NOTE PROVIDER - CARE PROVIDER_API CALL
Lefty Garvey)  Surgery  [address], [address]  [place] [address]  Phone: [phone]  Fax: [phone]  Follow Up Time:

## 2022-08-22 NOTE — PROGRESS NOTE ADULT - NS ATTEND AMEND GEN_ALL_CORE FT
Pt reports doing well. RLQ pain better just have expected incisional soreness  Denies nausea or emesis    abd soft, non-distended, appropriately TTP  incisions c/d/i w/ histacryl in place    - regular diet  - d/c home    Lefty Garvey MD  Attending Physician

## 2022-08-22 NOTE — DISCHARGE NOTE NURSING/CASE MANAGEMENT/SOCIAL WORK - NSDCPEFALRISK_GEN_ALL_CORE
For information on Fall & Injury Prevention, visit: [email or web address] OR  [email or web address] OR  https://www.cdc.gov/steadi/patient.html

## 2022-08-22 NOTE — PROGRESS NOTE ADULT - ASSESSMENT
56 with acute appendicitis, s/p laparoscopic appendectomy 8/22  afebrile    -advance diet as tolerated   -pain control PRN  -home meds  -d/c planning

## 2022-08-22 NOTE — CHART NOTE - NSCHARTNOTEFT_GEN_A_CORE
Pt POD 0 s/p lap appy  resting comfortably  no n/v  voided    Vital Signs Last 24 Hrs  T(C): 36.6 (22 Aug 2022 02:27), Max: 37.7 (21 Aug 2022 21:17)  T(F): 97.9 (22 Aug 2022 02:27), Max: 99.8 (21 Aug 2022 21:17)  HR: 80 (22 Aug 2022 02:27) (62 - 108)  BP: 95/56 (22 Aug 2022 02:27) (95/56 - 126/75)  BP(mean): 69 (22 Aug 2022 02:27) (63 - 69)  RR: 16 (22 Aug 2022 02:27) (16 - 22)  SpO2: 97% (22 Aug 2022 02:27) (96% - 100%)    Parameters below as of 22 Aug 2022 02:27  Patient On (Oxygen Delivery Method): room air    abd soft, inc CDI    stable post-op

## 2022-08-22 NOTE — DISCHARGE NOTE PROVIDER - NSDCCPCAREPLAN_GEN_ALL_CORE_FT
PRINCIPAL DISCHARGE DIAGNOSIS  Diagnosis: Appendicitis  Assessment and Plan of Treatment: Please follow-up with your surgeon in 1 week. Drink plenty of fluids and rest as needed. Call for any fever over 101, nausea, vomiting, severe pain, no passing of gas or bowel movement.  DIET: You may resume your regular diet as normal.   SURGICAL SITES  : Remove outer dressing and keep white steri-strips in place allowing them to fall off on their own. You may shower 48 hours post-operatively but do not bathe or soak in the water for 1-2 weeks; pat dry. If you notice any signs of surgical site infection (ie. redness, swelling, pain, pus drainage), please seek medical care immediately.   ACTIVITY  : Do not lift anything heavier than 10 pounds for 2 weeks and avoid strenuous activity for 4-6 weeks.   PAIN CONTROL: You may take Motrin 600mg-800mg (with food) every 6 hours or Tylenol 650mg-1000mg every 6 hours as needed for mild pain. Stagger one medication 3 hours after the other for maximum pain control. Maximum daily dose of Tylenol should not exceed 4000mg/day.      SECONDARY DISCHARGE DIAGNOSES  Diagnosis: Hypothyroid  Assessment and Plan of Treatment: continue home medication

## 2022-08-22 NOTE — DISCHARGE NOTE PROVIDER - NSDCMRMEDTOKEN_GEN_ALL_CORE_FT
levothyroxine 50 mcg (0.05 mg) oral tablet: 1 tab(s) orally once a day  Monday through Thursday  levothyroxine 75 mcg (0.075 mg) oral tablet: 1 tab(s) orally once a day  Friday through Sunday  Tylenol Extra Strength 500 mg oral tablet: 2 tab(s) orally every 6 hours MDD:4

## 2022-08-22 NOTE — DISCHARGE NOTE PROVIDER - HOSPITAL COURSE
HPI:  57 yo F with PMH papillary thyroid carcinoma s/p total thyroidectomy, radioactive iodine 2022, Thalassemia anemia on FeSO4,  PSH  c/o abdominal pain that began today. Pt states pain started in mid upper abdomen, then mid abdomen and is now in right lower quadrant.  Pt admits to nausea and diarrhea.  Patient was found to have acute appendicitis on CT, patient underwent laparoscopic appendectomy and tolerated procedure well. Patient is now stable for discharge as vital signs are stable, pain is well controlled and tolerating a regular diet. Patient was informed of the reason for this intervention.

## 2022-08-22 NOTE — PROGRESS NOTE ADULT - SUBJECTIVE AND OBJECTIVE BOX
INTERVAL HPI/OVERNIGHT EVENTS:  Pt see and examined at bedside  No acute complaints     MEDICATIONS  (STANDING):  acetaminophen   IVPB .. 1000 milliGRAM(s) IV Intermittent once  acetaminophen   IVPB .. 1000 milliGRAM(s) IV Intermittent once  ibuprofen  Tablet. 600 milliGRAM(s) Oral once  ibuprofen  Tablet. 600 milliGRAM(s) Oral once  ibuprofen  Tablet. 600 milliGRAM(s) Oral once  ibuprofen  Tablet. 600 milliGRAM(s) Oral once  levothyroxine 50 MICROGram(s) Oral daily    MEDICATIONS  (PRN):  ondansetron Injectable 4 milliGRAM(s) IV Push every 6 hours PRN Nausea    Vital Signs Last 24 Hrs  T(C): 36.9 (22 Aug 2022 06:41), Max: 37.7 (21 Aug 2022 21:17)  T(F): 98.4 (22 Aug 2022 06:41), Max: 99.8 (21 Aug 2022 21:17)  HR: 70 (22 Aug 2022 06:41) (62 - 108)  BP: 94/55 (22 Aug 2022 06:41) (94/55 - 126/75)  BP(mean): 68 (22 Aug 2022 06:41) (63 - 69)  RR: 16 (22 Aug 2022 06:41) (16 - 22)  SpO2: 99% (22 Aug 2022 06:41) (96% - 100%)    Parameters below as of 22 Aug 2022 06:41  Patient On (Oxygen Delivery Method): room air    Physical:  General: A&Ox3. NAD.  Chest: respiration unlabored  Abdomen: Soft nondistended, appropriate incisional tenderness. no guarding    I&O's Detail    21 Aug 2022 07:01  -  22 Aug 2022 07:00  --------------------------------------------------------  IN:    Lactated Ringers Bolus: 1000 mL  Total IN: 1000 mL    OUT:    Indwelling Catheter - Urethral (mL): 30 mL  Total OUT: 30 mL    Total NET: 970 mL    LABS:                        8.4    8.62  )-----------( 112      ( 22 Aug 2022 06:20 )             26.2             08-22    143  |  108  |  8   ----------------------------<  132<H>  3.8   |  28  |  1.07    Ca    6.7<L>      22 Aug 2022 06:20  Mg     2.1     08-21    TPro  7.5  /  Alb  3.8  /  TBili  2.1<H>  /  DBili  x   /  AST  37  /  ALT  31  /  AlkPhos  40  08-21

## 2022-08-24 LAB — SURGICAL PATHOLOGY STUDY: SIGNIFICANT CHANGE UP

## 2023-02-04 ENCOUNTER — NON-APPOINTMENT (OUTPATIENT)
Age: 58
End: 2023-02-04

## 2023-02-05 ENCOUNTER — EMERGENCY (EMERGENCY)
Facility: HOSPITAL | Age: 58
LOS: 1 days | Discharge: HOME | End: 2023-02-05
Attending: STUDENT IN AN ORGANIZED HEALTH CARE EDUCATION/TRAINING PROGRAM
Payer: MEDICAID

## 2023-02-05 VITALS
RESPIRATION RATE: 18 BRPM | HEART RATE: 62 BPM | OXYGEN SATURATION: 96 % | SYSTOLIC BLOOD PRESSURE: 112 MMHG | TEMPERATURE: 98 F | DIASTOLIC BLOOD PRESSURE: 73 MMHG

## 2023-02-05 VITALS
TEMPERATURE: 98 F | SYSTOLIC BLOOD PRESSURE: 113 MMHG | HEIGHT: 62 IN | OXYGEN SATURATION: 98 % | DIASTOLIC BLOOD PRESSURE: 75 MMHG | RESPIRATION RATE: 18 BRPM | WEIGHT: 115.08 LBS | HEART RATE: 80 BPM

## 2023-02-05 DIAGNOSIS — E89.0 POSTPROCEDURAL HYPOTHYROIDISM: Chronic | ICD-10-CM

## 2023-02-05 DIAGNOSIS — Z98.891 HISTORY OF UTERINE SCAR FROM PREVIOUS SURGERY: Chronic | ICD-10-CM

## 2023-02-05 LAB
ALBUMIN SERPL ELPH-MCNC: 3.4 G/DL — LOW (ref 3.5–5)
ALP SERPL-CCNC: 96 U/L — SIGNIFICANT CHANGE UP (ref 40–120)
ALT FLD-CCNC: 142 U/L DA — HIGH (ref 10–60)
ANION GAP SERPL CALC-SCNC: 5 MMOL/L — SIGNIFICANT CHANGE UP (ref 5–17)
AST SERPL-CCNC: 59 U/L — HIGH (ref 10–40)
BASE EXCESS BLDV CALC-SCNC: 6.5 MMOL/L — SIGNIFICANT CHANGE UP
BASOPHILS # BLD AUTO: 0.01 K/UL — SIGNIFICANT CHANGE UP (ref 0–0.2)
BASOPHILS NFR BLD AUTO: 0.4 % — SIGNIFICANT CHANGE UP (ref 0–2)
BILIRUB SERPL-MCNC: 0.6 MG/DL — SIGNIFICANT CHANGE UP (ref 0.2–1.2)
BUN SERPL-MCNC: 8 MG/DL — SIGNIFICANT CHANGE UP (ref 7–18)
CALCIUM SERPL-MCNC: 7.7 MG/DL — LOW (ref 8.4–10.5)
CHLORIDE SERPL-SCNC: 103 MMOL/L — SIGNIFICANT CHANGE UP (ref 96–108)
CO2 SERPL-SCNC: 31 MMOL/L — SIGNIFICANT CHANGE UP (ref 22–31)
CREAT SERPL-MCNC: 0.77 MG/DL — SIGNIFICANT CHANGE UP (ref 0.5–1.3)
CRP SERPL-MCNC: 30 MG/L — HIGH
D DIMER BLD IA.RAPID-MCNC: 162 NG/ML DDU — SIGNIFICANT CHANGE UP
EGFR: 89 ML/MIN/1.73M2 — SIGNIFICANT CHANGE UP
EOSINOPHIL # BLD AUTO: 0.02 K/UL — SIGNIFICANT CHANGE UP (ref 0–0.5)
EOSINOPHIL NFR BLD AUTO: 0.7 % — SIGNIFICANT CHANGE UP (ref 0–6)
FERRITIN SERPL-MCNC: 311 NG/ML — HIGH (ref 15–150)
GLUCOSE SERPL-MCNC: 148 MG/DL — HIGH (ref 70–99)
HCO3 BLDV-SCNC: 33 MMOL/L — HIGH (ref 22–29)
HCT VFR BLD CALC: 33.9 % — LOW (ref 34.5–45)
HGB BLD-MCNC: 10.4 G/DL — LOW (ref 11.5–15.5)
HIV 1 & 2 AB SERPL IA.RAPID: SIGNIFICANT CHANGE UP
IMM GRANULOCYTES NFR BLD AUTO: 0 % — SIGNIFICANT CHANGE UP (ref 0–0.9)
LYMPHOCYTES # BLD AUTO: 0.72 K/UL — LOW (ref 1–3.3)
LYMPHOCYTES # BLD AUTO: 26.2 % — SIGNIFICANT CHANGE UP (ref 13–44)
MCHC RBC-ENTMCNC: 18.8 PG — LOW (ref 27–34)
MCHC RBC-ENTMCNC: 30.7 GM/DL — LOW (ref 32–36)
MCV RBC AUTO: 61.3 FL — LOW (ref 80–100)
MONOCYTES # BLD AUTO: 0.43 K/UL — SIGNIFICANT CHANGE UP (ref 0–0.9)
MONOCYTES NFR BLD AUTO: 15.6 % — HIGH (ref 2–14)
NEUTROPHILS # BLD AUTO: 1.57 K/UL — LOW (ref 1.8–7.4)
NEUTROPHILS NFR BLD AUTO: 57.1 % — SIGNIFICANT CHANGE UP (ref 43–77)
NRBC # BLD: 0 /100 WBCS — SIGNIFICANT CHANGE UP (ref 0–0)
PCO2 BLDV: 53 MMHG — HIGH (ref 39–42)
PH BLDV: 7.4 — SIGNIFICANT CHANGE UP (ref 7.32–7.43)
PLATELET # BLD AUTO: 120 K/UL — LOW (ref 150–400)
PO2 BLDV: 30 MMHG — SIGNIFICANT CHANGE UP
POTASSIUM SERPL-MCNC: 4 MMOL/L — SIGNIFICANT CHANGE UP (ref 3.5–5.3)
POTASSIUM SERPL-SCNC: 4 MMOL/L — SIGNIFICANT CHANGE UP (ref 3.5–5.3)
PROCALCITONIN SERPL-MCNC: 0.19 NG/ML — HIGH (ref 0.02–0.1)
PROT SERPL-MCNC: 7.5 G/DL — SIGNIFICANT CHANGE UP (ref 6–8.3)
RBC # BLD: 5.53 M/UL — HIGH (ref 3.8–5.2)
RBC # FLD: 14.7 % — HIGH (ref 10.3–14.5)
SAO2 % BLDV: 37.4 % — SIGNIFICANT CHANGE UP
SARS-COV-2 RNA SPEC QL NAA+PROBE: DETECTED
SODIUM SERPL-SCNC: 139 MMOL/L — SIGNIFICANT CHANGE UP (ref 135–145)
TROPONIN I, HIGH SENSITIVITY RESULT: 4.8 NG/L — SIGNIFICANT CHANGE UP
WBC # BLD: 2.75 K/UL — LOW (ref 3.8–10.5)
WBC # FLD AUTO: 2.75 K/UL — LOW (ref 3.8–10.5)

## 2023-02-05 PROCEDURE — 85025 COMPLETE CBC W/AUTO DIFF WBC: CPT

## 2023-02-05 PROCEDURE — 86140 C-REACTIVE PROTEIN: CPT

## 2023-02-05 PROCEDURE — 80053 COMPREHEN METABOLIC PANEL: CPT

## 2023-02-05 PROCEDURE — 84484 ASSAY OF TROPONIN QUANT: CPT

## 2023-02-05 PROCEDURE — 71045 X-RAY EXAM CHEST 1 VIEW: CPT

## 2023-02-05 PROCEDURE — 87635 SARS-COV-2 COVID-19 AMP PRB: CPT

## 2023-02-05 PROCEDURE — 86703 HIV-1/HIV-2 1 RESULT ANTBDY: CPT

## 2023-02-05 PROCEDURE — 85379 FIBRIN DEGRADATION QUANT: CPT

## 2023-02-05 PROCEDURE — 99285 EMERGENCY DEPT VISIT HI MDM: CPT

## 2023-02-05 PROCEDURE — 82728 ASSAY OF FERRITIN: CPT

## 2023-02-05 PROCEDURE — 99285 EMERGENCY DEPT VISIT HI MDM: CPT | Mod: 25

## 2023-02-05 PROCEDURE — 93005 ELECTROCARDIOGRAM TRACING: CPT

## 2023-02-05 PROCEDURE — 84145 PROCALCITONIN (PCT): CPT

## 2023-02-05 PROCEDURE — 71045 X-RAY EXAM CHEST 1 VIEW: CPT | Mod: 26

## 2023-02-05 PROCEDURE — 82803 BLOOD GASES ANY COMBINATION: CPT

## 2023-02-05 PROCEDURE — 36415 COLL VENOUS BLD VENIPUNCTURE: CPT

## 2023-02-05 NOTE — ED ADULT NURSE NOTE - OBJECTIVE STATEMENT
Pt presents to ED with c/o chest fullness and burning, c/o feeling lightheaded when she coughed this AM. Reports she tested COVID positive x 5 days ago. Denies any SOB. No distress noted.

## 2023-02-05 NOTE — ED ADULT TRIAGE NOTE - CHIEF COMPLAINT QUOTE
c/o cough , chest fullness with lightheadedness , feels like gonna pass out this morning , had a tele health consult 3 hrs ago was told to go to ED .  reports is covid POS / home kit

## 2023-02-05 NOTE — ED PROVIDER NOTE - CLINICAL SUMMARY MEDICAL DECISION MAKING FREE TEXT BOX
57F presenting with chest pain. no active chest pain or trouble breathing. covid+. concern for ACS, PE, PNA. labs, cxr, ekg.     cxr clear on my interpretation.

## 2023-02-05 NOTE — ED PROVIDER NOTE - OBJECTIVE STATEMENT
57F, no signicaint pmh, presenting with chest pressure. patient reports around 7am she felt some chest discomfort and then became sweaty and clammy. episode lasted about 5 minutes. no active chest pain or trouble breathing. tested positive for covid 4 days ago.

## 2023-02-05 NOTE — ED PROVIDER NOTE - NSFOLLOWUPINSTRUCTIONS_ED_ALL_ED_FT
improved
You were seen in the emergency department for chest pain and shortness of breath.     Please follow-up with your primary care doctor in the next 24-48 hours.     If you have any worsening symptoms, severe headache, chest pain, trouble breathing, please return to the emergency department.

## 2023-02-05 NOTE — ED ADULT NURSE NOTE - CAS EDN INTEG ASSESS
Multi-Disciplinary Problems (from Behavioral Health Treatment Plan)    Active Problems     Problem: Depression  Start Date: 08/29/22    Problem Details: The patient self-scales this problem as a 5 with 10 being the worst.        Goal Priority Start Date Expected End Date End Date    Patient will demonstrate the ability to initiate new constructive life skills outside of sessions on a consistent basis. -- 08/29/22 -- --    Goal Details: Progress toward goal:  Not appropriate to rate progress toward goal since this is the initial treatment plan.        Goal Intervention Frequency Start Date End Date    Assist patient in setting attainable activities of daily living goals. PRN 08/29/22 --    Goal Intervention Frequency Start Date End Date    Provide education about depression Weekly 08/29/22 --    Intervention Details: Duration of treatment until until remission of symptoms.        Goal Intervention Frequency Start Date End Date    Assist patient in developing healthy coping strategies. Weekly 08/29/22 --    Intervention Details: Duration of treatment until until remission of symptoms.                    Reviewed By     Nigel Galicia APRN 08/29/22 [id number]                 I have discussed and reviewed this treatment plan with the patient.      
- - -

## 2023-02-05 NOTE — ED PROVIDER NOTE - PATIENT PORTAL LINK FT
You can access the FollowMyHealth Patient Portal offered by [facility] by registering at the following website: [email or web address]. By joining [facility]’s FollowMyHealth portal, you will also be able to view your health information using other applications (apps) compatible with our system.

## 2023-02-06 PROBLEM — C73 MALIGNANT NEOPLASM OF THYROID GLAND: Chronic | Status: ACTIVE | Noted: 2022-08-21

## 2023-05-24 ENCOUNTER — APPOINTMENT (OUTPATIENT)
Dept: SURGICAL ONCOLOGY | Facility: CLINIC | Age: 58
End: 2023-05-24
Payer: MEDICAID

## 2023-05-24 VITALS
HEART RATE: 64 BPM | TEMPERATURE: 98 F | SYSTOLIC BLOOD PRESSURE: 131 MMHG | OXYGEN SATURATION: 99 % | WEIGHT: 117 LBS | HEIGHT: 62 IN | RESPIRATION RATE: 17 BRPM | DIASTOLIC BLOOD PRESSURE: 81 MMHG | BODY MASS INDEX: 21.53 KG/M2

## 2023-05-24 PROCEDURE — 99213 OFFICE O/P EST LOW 20 MIN: CPT

## 2023-05-25 NOTE — PHYSICAL EXAM
[Normal] : supple, no neck mass and thyroid not enlarged [Normal Neck Lymph Nodes] : normal neck lymph nodes  [Normal Supraclavicular Lymph Nodes] : normal supraclavicular lymph nodes [Normal Groin Lymph Nodes] : normal groin lymph nodes [Normal Axillary Lymph Nodes] : normal axillary lymph nodes [Normal] : oriented to person, place and time, with appropriate affect [de-identified] : incision healing well with no evidence of infection, seroma or hematoma  [FreeTextEntry1] : COVID-19 precautions as per [facility] policy was universally followed

## 2023-05-25 NOTE — CONSULT LETTER
[Dear  ___] : Dear  [unfilled], [( Thank you for referring [unfilled] for consultation for _____ )] : Thank you for referring [unfilled] for consultation for [unfilled] [Consult Letter:] : I had the pleasure of evaluating your patient, [unfilled]. [Consult Closing:] : Thank you very much for allowing me to participate in the care of this patient.  If you have any questions, please do not hesitate to contact me. [Please see my note below.] : Please see my note below. [Sincerely,] : Sincerely, [DrCatherine  ___] : Dr. HAYES [FreeTextEntry2] : Daniel Yeung  [FreeTextEntry3] : Markos Sotomayor MD, FICS, FACS\par , Surgical Oncology \par [employer] [facility] [employer] [facility] \par [address]\[place] [address]\par \[place] [address]\par \par (mob) [phone]\par (o) [phone]\par (f) [phone]\par

## 2023-05-25 NOTE — HISTORY OF PRESENT ILLNESS
[de-identified] : Ms. CARLOZ YING is a 56 year old female who present today for follow-up, s/p total thyroidectomy on 11/15/21. \par \par \par Initial consultation for papillary thyroid carcinoma in October 2021. Referred by: Dr. Daniel Yeung. \par Patient states she had gone for her annual physical exam and complaint to her PCP she had left neck pain and swelling and head pressure and PCP ordered a left neck US, which showed enlarged heterogenous thyroid gland. \par Past medical history: Menopause, Anemia(Thalassemia)  \par Family History: Breast cancer in sister at age 45\par Per patient had colonoscopy at age 49- per patient normal \par \par Thyroid US 9/4/21: Enlarged heterogenous thyroid gland with 3 noduled in each lobe largest right 1.1 cm and left 3 cm, TI-RADS 4.Biopsy of the dominant left-sided nodule is recommended and f/u \par \par Thyroid gland, left lobe, upper pole biopsy 10/8/21: Positive for malignancy. Papillary thyroid carcinoma. \par \par \par **SURGERY**\par s/p total thyroidectomy on 11/15/2021 : \par Final Path \par 1. Right cervical lymph node excision: 1 lymph node negative for metastatic carcinoma \par 2. Total thyroidectomy: Multifocal papillary thyroid carcinoma, mpT2, bilateral; 2 lesions: left lobe: papillary carcinoma, classic type, size 2.5 cm, located less than 1 mm from the anterior thyroidectomy margin; Right lobe: papillary carcinoma, follicular variant, infiltrative, size 0.5 cm, located less than 1 mm from the posterior thyroidectomy margin; One perithyroid lymph node, negative for metastatic carcinoma. Lymphovascular invasion is not identified. Two unremarkable left parathyroid glands are identified. Nonneoplastic thyroid gland shows Hashimoto's thyroiditis and multinodular goiter. \par 3. Cervical lymph node, level 6; excision: One lymph node, negative for metastatic carcinoma. pN0a \par \par 12/1/21: patient is complaint of some harshness, and tingling in fingers. Denies fever, chill, or pain. Patient states is tolerating diet\par \par 5/24/23: Patient present today with complaint of discomfort along the left jaw line radiating to ear and down to neck. She denies fever, chills or night sweat. \par \par PCP: Dr. Daniel Yeung.

## 2023-05-25 NOTE — ASSESSMENT
[FreeTextEntry1] : IMP: 55 year old female s/p total thyroidectomy for multifocal papillary thyroid carcinoma\par  \par 5/24/23:complaint of discomfort along the left jaw line radiating to ear down to neck\par \par PLAN: \par Referred to see Dr. Whitlock for evaluation of her complaints - ?parotid salivary gland related\par ordered US neck \par Patient to continue seeing   -endocrinologist \par \par \par \par I have discussed the diagnosis, therapeutic plan and options with the patient at length. Patient expressed verbal understanding to proceed with proposed plan. All questions answered. \par

## 2023-06-06 ENCOUNTER — APPOINTMENT (OUTPATIENT)
Dept: ULTRASOUND IMAGING | Facility: CLINIC | Age: 58
End: 2023-06-06
Payer: COMMERCIAL

## 2023-06-06 ENCOUNTER — OUTPATIENT (OUTPATIENT)
Dept: OUTPATIENT SERVICES | Facility: HOSPITAL | Age: 58
LOS: 1 days | End: 2023-06-06
Payer: COMMERCIAL

## 2023-06-06 DIAGNOSIS — Z98.891 HISTORY OF UTERINE SCAR FROM PREVIOUS SURGERY: Chronic | ICD-10-CM

## 2023-06-06 DIAGNOSIS — E89.0 POSTPROCEDURAL HYPOTHYROIDISM: Chronic | ICD-10-CM

## 2023-06-06 DIAGNOSIS — C73 MALIGNANT NEOPLASM OF THYROID GLAND: ICD-10-CM

## 2023-06-06 PROCEDURE — 76536 US EXAM OF HEAD AND NECK: CPT

## 2023-06-06 PROCEDURE — 76536 US EXAM OF HEAD AND NECK: CPT | Mod: 26

## 2023-06-20 ENCOUNTER — APPOINTMENT (OUTPATIENT)
Dept: SURGICAL ONCOLOGY | Facility: CLINIC | Age: 58
End: 2023-06-20
Payer: COMMERCIAL

## 2023-06-20 VITALS
BODY MASS INDEX: 21.53 KG/M2 | HEART RATE: 73 BPM | WEIGHT: 117 LBS | SYSTOLIC BLOOD PRESSURE: 118 MMHG | DIASTOLIC BLOOD PRESSURE: 72 MMHG | HEIGHT: 62 IN

## 2023-06-20 PROCEDURE — 99215 OFFICE O/P EST HI 40 MIN: CPT

## 2023-06-20 NOTE — ASSESSMENT
[FreeTextEntry1] : IMP: 55 year old female s/p total thyroidectomy for multifocal papillary thyroid carcinoma without artur metastases ( stage I )\par  \par U/S neck 6/6/2023 showed unremarkable post-op soft tissue \par \par now with a left oropharynx sub-mucosal lesion\par \par PLAN: \par CT of neck now\par laryngoscopy and biopsy\par \par \par \par

## 2023-06-20 NOTE — CONSULT LETTER
[Dear  ___] : Dear  [unfilled], [Consult Letter:] : I had the pleasure of evaluating your patient, [unfilled]. [Please see my note below.] : Please see my note below. [Consult Closing:] : Thank you very much for allowing me to participate in the care of this patient.  If you have any questions, please do not hesitate to contact me. [Sincerely,] : Sincerely, [FreeTextEntry1] : I will keep you informed of the CT and laryngoscopy results. [FreeTextEntry3] : Zeke Whitlock MD FACS\par Chief of Surgical Oncology\par \par

## 2023-06-20 NOTE — HISTORY OF PRESENT ILLNESS
[de-identified] : Ms. CARLOZ YING is a 57 year old woman here for an initial consultation for an oral lesion, referred by my colleague, Dr. Markos Sotomayor. \par \par Patient states she had gone for her annual physical exam and complaint to her PCP she had left neck pain and swelling and head pressure and PCP ordered a left neck US, which showed enlarged heterogenous thyroid gland. \par Past medical history: Menopause, Anemia(Thalassemia)  \par Family History: Breast cancer in sister at age 45\par Per patient had colonoscopy at age 49- per patient normal \par \par s/p total thyroidectomy on 11/15/2021 : Final Path \par 1. Right cervical lymph node excision: 1 lymph node negative for metastatic carcinoma \par 2. Total thyroidectomy: Multifocal papillary thyroid carcinoma, mpT2, bilateral; 2 lesions: left lobe: papillary carcinoma, classic type, size 2.5 cm, located less than 1 mm from the anterior thyroidectomy margin; Right lobe: papillary carcinoma, follicular variant, infiltrative, size 0.5 cm, located less than 1 mm from the posterior thyroidectomy margin; One perithyroid lymph node, negative for metastatic carcinoma. Lymphovascular invasion is not identified. Two unremarkable left parathyroid glands are identified. Nonneoplastic thyroid gland shows Hashimoto's thyroiditis and multinodular goiter. \par 3. Cervical lymph node, level 6; excision: One lymph node, negative for metastatic carcinoma. pN0a \par \par now w/ complaints of discomfort along the left oropharynx and a mass in that area\par \par U/S neck 6/6/2023 showed unremarkable post-op soft tissue \par \par PCP: Dr. Daniel Yeung.

## 2023-06-20 NOTE — PHYSICAL EXAM
[Normal Neck Lymph Nodes] : normal neck lymph nodes  [Normal Supraclavicular Lymph Nodes] : normal supraclavicular lymph nodes [Normal Groin Lymph Nodes] : normal groin lymph nodes [Normal Axillary Lymph Nodes] : normal axillary lymph nodes [Normal] : oriented to person, place and time, with appropriate affect [de-identified] : 1 cm submucoal lesion left oropharynx [de-identified] : No recurrence in thyroid bed and no adenoapthy

## 2023-06-21 LAB
BUN SERPL-MCNC: 10 MG/DL
CREAT SERPL-MCNC: 0.77 MG/DL
EGFR: 89 ML/MIN/1.73M2

## 2023-06-23 ENCOUNTER — RESULT REVIEW (OUTPATIENT)
Age: 58
End: 2023-06-23

## 2023-06-28 ENCOUNTER — OUTPATIENT (OUTPATIENT)
Dept: OUTPATIENT SERVICES | Facility: HOSPITAL | Age: 58
LOS: 1 days | End: 2023-06-28
Payer: COMMERCIAL

## 2023-06-28 ENCOUNTER — APPOINTMENT (OUTPATIENT)
Dept: CT IMAGING | Facility: IMAGING CENTER | Age: 58
End: 2023-06-28
Payer: COMMERCIAL

## 2023-06-28 DIAGNOSIS — Z98.891 HISTORY OF UTERINE SCAR FROM PREVIOUS SURGERY: Chronic | ICD-10-CM

## 2023-06-28 DIAGNOSIS — E89.0 POSTPROCEDURAL HYPOTHYROIDISM: Chronic | ICD-10-CM

## 2023-06-28 DIAGNOSIS — C73 MALIGNANT NEOPLASM OF THYROID GLAND: ICD-10-CM

## 2023-06-28 PROCEDURE — 70491 CT SOFT TISSUE NECK W/DYE: CPT

## 2023-06-28 PROCEDURE — 70491 CT SOFT TISSUE NECK W/DYE: CPT | Mod: 26

## 2023-07-25 ENCOUNTER — OUTPATIENT (OUTPATIENT)
Dept: OUTPATIENT SERVICES | Facility: HOSPITAL | Age: 58
LOS: 1 days | End: 2023-07-25
Payer: COMMERCIAL

## 2023-07-25 VITALS
HEIGHT: 62 IN | SYSTOLIC BLOOD PRESSURE: 109 MMHG | WEIGHT: 117.95 LBS | DIASTOLIC BLOOD PRESSURE: 81 MMHG | HEART RATE: 67 BPM | OXYGEN SATURATION: 100 % | TEMPERATURE: 98 F | RESPIRATION RATE: 16 BRPM

## 2023-07-25 DIAGNOSIS — Z98.891 HISTORY OF UTERINE SCAR FROM PREVIOUS SURGERY: Chronic | ICD-10-CM

## 2023-07-25 DIAGNOSIS — E89.0 POSTPROCEDURAL HYPOTHYROIDISM: Chronic | ICD-10-CM

## 2023-07-25 DIAGNOSIS — K13.70 UNSPECIFIED LESIONS OF ORAL MUCOSA: ICD-10-CM

## 2023-07-25 DIAGNOSIS — Z01.818 ENCOUNTER FOR OTHER PREPROCEDURAL EXAMINATION: ICD-10-CM

## 2023-07-25 DIAGNOSIS — C73 MALIGNANT NEOPLASM OF THYROID GLAND: ICD-10-CM

## 2023-07-25 LAB
ANION GAP SERPL CALC-SCNC: 8 MMOL/L — SIGNIFICANT CHANGE UP (ref 5–17)
APTT BLD: 31.8 SEC — SIGNIFICANT CHANGE UP (ref 24.5–35.6)
BLD GP AB SCN SERPL QL: SIGNIFICANT CHANGE UP
BUN SERPL-MCNC: 16 MG/DL — SIGNIFICANT CHANGE UP (ref 7–18)
CALCIUM SERPL-MCNC: 8.3 MG/DL — LOW (ref 8.4–10.5)
CHLORIDE SERPL-SCNC: 105 MMOL/L — SIGNIFICANT CHANGE UP (ref 96–108)
CO2 SERPL-SCNC: 26 MMOL/L — SIGNIFICANT CHANGE UP (ref 22–31)
CREAT SERPL-MCNC: 0.74 MG/DL — SIGNIFICANT CHANGE UP (ref 0.5–1.3)
EGFR: 94 ML/MIN/1.73M2 — SIGNIFICANT CHANGE UP
GLUCOSE SERPL-MCNC: 97 MG/DL — SIGNIFICANT CHANGE UP (ref 70–99)
HCT VFR BLD CALC: 34.7 % — SIGNIFICANT CHANGE UP (ref 34.5–45)
HGB BLD-MCNC: 10.9 G/DL — LOW (ref 11.5–15.5)
INR BLD: 1.05 RATIO — SIGNIFICANT CHANGE UP (ref 0.85–1.18)
MCHC RBC-ENTMCNC: 19 PG — LOW (ref 27–34)
MCHC RBC-ENTMCNC: 31.4 GM/DL — LOW (ref 32–36)
MCV RBC AUTO: 60.3 FL — LOW (ref 80–100)
NRBC # BLD: 0 /100 WBCS — SIGNIFICANT CHANGE UP (ref 0–0)
PLATELET # BLD AUTO: 135 K/UL — LOW (ref 150–400)
POTASSIUM SERPL-MCNC: 3.8 MMOL/L — SIGNIFICANT CHANGE UP (ref 3.5–5.3)
POTASSIUM SERPL-SCNC: 3.8 MMOL/L — SIGNIFICANT CHANGE UP (ref 3.5–5.3)
PROTHROM AB SERPL-ACNC: 12 SEC — SIGNIFICANT CHANGE UP (ref 9.5–13)
RBC # BLD: 5.75 M/UL — HIGH (ref 3.8–5.2)
RBC # FLD: 16.1 % — HIGH (ref 10.3–14.5)
SODIUM SERPL-SCNC: 139 MMOL/L — SIGNIFICANT CHANGE UP (ref 135–145)
TSH SERPL-MCNC: 0.04 UU/ML — LOW (ref 0.34–4.82)
WBC # BLD: 5.12 K/UL — SIGNIFICANT CHANGE UP (ref 3.8–10.5)
WBC # FLD AUTO: 5.12 K/UL — SIGNIFICANT CHANGE UP (ref 3.8–10.5)

## 2023-07-25 RX ORDER — LEVOTHYROXINE SODIUM 125 MCG
1 TABLET ORAL
Qty: 0 | Refills: 0 | DISCHARGE

## 2023-07-25 NOTE — H&P PST ADULT - PROBLEM SELECTOR PLAN 2
Scheduled for direct laryngoscopy and resection of oral lesions 7/31/2023  Preoperative instructions discussed and given to patient.   Discussed preprocedure skin preparation using  chlorhexidine gluconate 4% solution three days prior to  surgery.  Instructed patient to avoid aspirin and aspirin products, over the counter medications such as vitamins and herbal medications, one week prior to surgery.  Take Tylenol as needed for pain  Patient verbalized understanding of instructions

## 2023-07-25 NOTE — H&P PST ADULT - HISTORY OF PRESENT ILLNESS
58 y/o year old female with pmhx of right calcaneal fracture, thalassemia, bilateral rotator cuff tear, thyroid cancer s/p total thyroidectomy in 11/15/2021, is now diagnosed with unspecified lesions of oral mucosa. She is scheduled for direct laryngoscopy and resection of oral lesions 7/31/2023

## 2023-07-25 NOTE — H&P PST ADULT - ASSESSMENT
56 y/o year old female with pmhx of right calcaneal fracture, thalassemia, bilateral rotator cuff tear, thyroid cancer s/p total thyroidectomy in 11/15/2021, is now diagnosed with unspecified lesions of oral mucosa 56 y/o year old female with pmhx of right calcaneal fracture, thalassemia, bilateral rotator cuff tear, thyroid cancer s/p total thyroidectomy in 11/15/2021, is now diagnosed with unspecified lesions of oral mucosa  STOP BANG SCORE IS 2 56 y/o year old female with pmhx of right calcaneal fracture, thalassemia, bilateral rotator cuff tear, thyroid cancer s/p total thyroidectomy 11/15/2021, is now diagnosed with unspecified lesions of oral mucosa  STOP BANG SCORE IS 2

## 2023-07-25 NOTE — H&P PST ADULT - PROBLEM SELECTOR PLAN 1
Acquired hypothyroidism  Continue levothyroxine as prescribed  Take medication the morning of surgery with a sip of water  TSH/Free T4 here today

## 2023-07-26 LAB
A1C WITH ESTIMATED AVERAGE GLUCOSE RESULT: 5.5 % — SIGNIFICANT CHANGE UP (ref 4–5.6)
ESTIMATED AVERAGE GLUCOSE: 111 MG/DL — SIGNIFICANT CHANGE UP (ref 68–114)

## 2023-07-26 PROCEDURE — G0463: CPT

## 2023-07-26 PROCEDURE — 83036 HEMOGLOBIN GLYCOSYLATED A1C: CPT

## 2023-07-30 ENCOUNTER — TRANSCRIPTION ENCOUNTER (OUTPATIENT)
Age: 58
End: 2023-07-30

## 2023-07-31 ENCOUNTER — RESULT REVIEW (OUTPATIENT)
Age: 58
End: 2023-07-31

## 2023-07-31 ENCOUNTER — APPOINTMENT (OUTPATIENT)
Dept: SURGICAL ONCOLOGY | Facility: HOSPITAL | Age: 58
End: 2023-07-31

## 2023-07-31 ENCOUNTER — TRANSCRIPTION ENCOUNTER (OUTPATIENT)
Age: 58
End: 2023-07-31

## 2023-07-31 ENCOUNTER — OUTPATIENT (OUTPATIENT)
Dept: OUTPATIENT SERVICES | Facility: HOSPITAL | Age: 58
LOS: 1 days | End: 2023-07-31
Payer: COMMERCIAL

## 2023-07-31 VITALS
TEMPERATURE: 98 F | HEIGHT: 62 IN | WEIGHT: 117.95 LBS | DIASTOLIC BLOOD PRESSURE: 80 MMHG | HEART RATE: 62 BPM | OXYGEN SATURATION: 100 % | RESPIRATION RATE: 16 BRPM | SYSTOLIC BLOOD PRESSURE: 128 MMHG

## 2023-07-31 VITALS
OXYGEN SATURATION: 97 % | DIASTOLIC BLOOD PRESSURE: 73 MMHG | SYSTOLIC BLOOD PRESSURE: 117 MMHG | HEART RATE: 64 BPM | RESPIRATION RATE: 14 BRPM | TEMPERATURE: 98 F

## 2023-07-31 DIAGNOSIS — Z01.818 ENCOUNTER FOR OTHER PREPROCEDURAL EXAMINATION: ICD-10-CM

## 2023-07-31 DIAGNOSIS — E89.0 POSTPROCEDURAL HYPOTHYROIDISM: Chronic | ICD-10-CM

## 2023-07-31 DIAGNOSIS — Z98.891 HISTORY OF UTERINE SCAR FROM PREVIOUS SURGERY: Chronic | ICD-10-CM

## 2023-07-31 DIAGNOSIS — K13.70 UNSPECIFIED LESIONS OF ORAL MUCOSA: ICD-10-CM

## 2023-07-31 LAB
BLD GP AB SCN SERPL QL: SIGNIFICANT CHANGE UP
T4 FREE SERPL-MCNC: 1.77 NG/DL — SIGNIFICANT CHANGE UP

## 2023-07-31 PROCEDURE — 86900 BLOOD TYPING SEROLOGIC ABO: CPT

## 2023-07-31 PROCEDURE — 88304 TISSUE EXAM BY PATHOLOGIST: CPT | Mod: 26

## 2023-07-31 PROCEDURE — 36415 COLL VENOUS BLD VENIPUNCTURE: CPT

## 2023-07-31 PROCEDURE — 86901 BLOOD TYPING SEROLOGIC RH(D): CPT

## 2023-07-31 PROCEDURE — 88304 TISSUE EXAM BY PATHOLOGIST: CPT

## 2023-07-31 PROCEDURE — 86850 RBC ANTIBODY SCREEN: CPT

## 2023-07-31 PROCEDURE — 31535 LARYNGOSCOPY W/BIOPSY: CPT

## 2023-07-31 PROCEDURE — C9399: CPT

## 2023-07-31 RX ORDER — FENTANYL CITRATE 50 UG/ML
50 INJECTION INTRAVENOUS
Refills: 0 | Status: DISCONTINUED | OUTPATIENT
Start: 2023-07-31 | End: 2023-07-31

## 2023-07-31 RX ORDER — SODIUM CHLORIDE 9 MG/ML
3 INJECTION INTRAMUSCULAR; INTRAVENOUS; SUBCUTANEOUS EVERY 8 HOURS
Refills: 0 | Status: DISCONTINUED | OUTPATIENT
Start: 2023-07-31 | End: 2023-07-31

## 2023-07-31 RX ORDER — METOCLOPRAMIDE HCL 10 MG
10 TABLET ORAL ONCE
Refills: 0 | Status: DISCONTINUED | OUTPATIENT
Start: 2023-07-31 | End: 2023-07-31

## 2023-07-31 RX ORDER — LEVOTHYROXINE SODIUM 125 MCG
1 TABLET ORAL
Qty: 0 | Refills: 0 | DISCHARGE

## 2023-07-31 RX ORDER — IBUPROFEN 200 MG
400 TABLET ORAL ONCE
Refills: 0 | Status: DISCONTINUED | OUTPATIENT
Start: 2023-07-31 | End: 2023-08-14

## 2023-07-31 RX ORDER — FENTANYL CITRATE 50 UG/ML
25 INJECTION INTRAVENOUS
Refills: 0 | Status: DISCONTINUED | OUTPATIENT
Start: 2023-07-31 | End: 2023-07-31

## 2023-07-31 NOTE — ASU DISCHARGE PLAN (ADULT/PEDIATRIC) - ASU DC SPECIAL INSTRUCTIONSFT
Take tylenol and ibuprofen as needed for pain. Please call for a 2 week follow up appointment with your surgeon. Take Tylenol and ibuprofen as needed for pain. Please call for a 2 week follow up appointment with your surgeon.

## 2023-07-31 NOTE — ASU DISCHARGE PLAN (ADULT/PEDIATRIC) - CARE PROVIDER_API CALL
Zeke Whitlock  Surgery  [address] [place] [id number]  Phone: [phone]  Fax: [phone]  Established Patient  Follow Up Time: 2 weeks

## 2023-08-02 LAB — SURGICAL PATHOLOGY STUDY: SIGNIFICANT CHANGE UP

## 2023-08-14 ENCOUNTER — APPOINTMENT (OUTPATIENT)
Dept: SURGICAL ONCOLOGY | Facility: CLINIC | Age: 58
End: 2023-08-14
Payer: COMMERCIAL

## 2023-08-14 VITALS
SYSTOLIC BLOOD PRESSURE: 117 MMHG | BODY MASS INDEX: 21.53 KG/M2 | HEIGHT: 62 IN | DIASTOLIC BLOOD PRESSURE: 78 MMHG | HEART RATE: 67 BPM | WEIGHT: 117 LBS | RESPIRATION RATE: 16 BRPM | OXYGEN SATURATION: 99 %

## 2023-08-14 DIAGNOSIS — C73 MALIGNANT NEOPLASM OF THYROID GLAND: ICD-10-CM

## 2023-08-14 PROCEDURE — 99024 POSTOP FOLLOW-UP VISIT: CPT

## 2023-08-14 NOTE — HISTORY OF PRESENT ILLNESS
[de-identified] : Ms. CARLOZ YING is a 57 year old woman here for a post-op visit for an oral lesion,  referred by my colleague, Dr. Markos Sotomayor.   Patient states she had gone for her annual physical exam and complaint to her PCP she had left neck pain and swelling and head pressure and PCP ordered a left neck US, which showed enlarged heterogenous thyroid gland.  Past medical history: Menopause, Anemia(Thalassemia)   Family History: Breast cancer in sister at age 45 Per patient had colonoscopy at age 49- per patient normal   s/p total thyroidectomy on 11/15/2021 : Final Path, multifocal papillary thyroid carcinoma, 2.5 cm (left lobe) & 5 mm (right lobe), no LVI, margins negative, 0/3 LN involved, mpT2 N0a  now w/ complaints of discomfort along the left oropharynx and a mass in that area U/S neck 6/6/2023 showed unremarkable post-op soft tissue   s/p direct laryngoscopy w/ biopsy on 7/31/2023, path benign    PCP: Dr. Daniel Yeung.

## 2023-08-14 NOTE — PHYSICAL EXAM
[Normal Neck Lymph Nodes] : normal neck lymph nodes  [Normal Supraclavicular Lymph Nodes] : normal supraclavicular lymph nodes [Normal Groin Lymph Nodes] : normal groin lymph nodes [Normal Axillary Lymph Nodes] : normal axillary lymph nodes [Normal] : normal lips, teeth and gums  [de-identified] : area on left side of throat healing but no infection [de-identified] : No recurrence in thyroid bed and no adenoapthy

## 2023-08-14 NOTE — ASSESSMENT
[FreeTextEntry1] : IMP: 57 year old female s/p total thyroidectomy for multifocal papillary thyroid carcinoma without artur metastases (stage I)   now w/ complaints of discomfort along the left oropharynx and a mass in that area  CT neck 7/12/2023- Indeterminate 7 x 5 mm low-density region in the left palatine tonsil, likely present on prior exam, although slightly larger. This may represent a mucous retention cyst. Consider follow-up with contrast-enhanced MR soft tissue neck for further characterization. An area of infection or necrotic malignancy considered less likely but remain in the differential.  No evidence of recurrent soft tissue within the thyroidectomy bed. No pathologic adenopathy.  s/p direct laryngoscopy w/ biopsy on 7/31/2023, mucus drained from the cystic area; mucosal biopsy benign  PLAN:  RTO  PRN

## 2023-08-14 NOTE — CONSULT LETTER
[Dear  ___] : Dear  [unfilled], [Consult Letter:] : I had the pleasure of evaluating your patient, [unfilled]. [Please see my note below.] : Please see my note below. [Consult Closing:] : Thank you very much for allowing me to participate in the care of this patient.  If you have any questions, please do not hesitate to contact me. [Sincerely,] : Sincerely, [Courtesy Letter:] : I had the pleasure of seeing your patient, [unfilled], in my office today. [FreeTextEntry1] : The area was drained an the mucosa was benign; the laryngoscopy was otherwise negative. [FreeTextEntry3] : Zeke Whitlock MD FACS\par  Chief of Surgical Oncology\par  \par

## 2024-01-04 NOTE — ED PROVIDER NOTE - WR ORDER NAME 1
Acquired hypothyroidism E03.9    Uterine perforation S37.69XA    Gastroesophageal reflux disease without esophagitis K21.9     SURGICAL HISTORY       Past Surgical History:   Procedure Laterality Date    COLONOSCOPY  10/24/2016    diverticulosis    COSMETIC SURGERY      botox every 3 mos for migraines    DILATION AND CURETTAGE OF UTERUS  09/02/2022    DILATION AND CURETTAGE OF UTERUS N/A 09/02/2022    DILATATION AND CURETTAGE HYSTEROSCOPY performed by Berlin Pandey MD at [facility] OR    FINGER TRIGGER RELEASE Right 11/17/2023    RIGHT THUMB A-1 PULLEY TRIGGER RELEASE (Right: Hand)    FINGER TRIGGER RELEASE Right 11/17/2023    RIGHT THUMB A-1 PULLEY TRIGGER RELEASE performed by Joe Aguilar MD at [facility] OR    LAPAROSCOPY  09/02/2022    DIAGNOSTIC, HYSTEROSCOPY    LAPAROSCOPY N/A 09/02/2022    LAPAROSCOPY DIAGNOSTIC performed by Berlin Pandey MD at [facility] OR    TONSILLECTOMY       CURRENT MEDICATIONS       Discharge Medication List as of 1/4/2024  7:06 PM        CONTINUE these medications which have NOT CHANGED    Details   methylPREDNISolone (MEDROL, PETER,) 4 MG tablet Take by mouth., Disp-1 kit, R-0Normal      ibuprofen (ADVIL;MOTRIN) 800 MG tablet Take 1 tablet by mouth 3 times daily (with meals), Disp-90 tablet, R-0Normal      acetaminophen (TYLENOL) 500 MG tablet Take 2 tablets by mouth every 6 hours as needed for Pain, Disp-30 tablet, R-0Normal      topiramate (TOPAMAX) 50 MG tablet TAKE 2 TABLETS BY MOUTH EVERY MORNING AND TAKE THREE TABLETS BY MOUTH EVERY NIGHT AT BEDTIME, Disp-150 tablet, R-2Normal      melatonin 10 MG CAPS capsule Take 1 capsule by mouth nightlyHistorical Med      Ubrogepant (UBRELVY) 100 MG TABS TAKE ONE TABLET BY MOUTH AT ONSET OF HEADACHE; MAY REPEAT ONE TABLET IN 2 HOURS IF NEEDED. NO MORE THAN 2 TABLETS IN 24 HOURS AND 4 TABLETS IN ONE WEEK, Disp-10 tablet, R-5Normal      levothyroxine (SYNTHROID) 75 MCG tablet TAKE ONE TABLET BY MOUTH DAILY, Disp-30 tablet, 
Xray Chest 1 View- PORTABLE-Urgent

## 2024-07-27 NOTE — REVIEW OF SYSTEMS
Refill approved for 1 month.  Richmond Ovalle needs to be seen for an appointment before further refills are given.     [Negative] : Heme/Lymph

## 2024-09-06 NOTE — ASU PREOP CHECKLIST - BMI (KG/M2)
Patient is asking for a call back in regards to her medication norgestimate-ethinyl estradiol (Sprintec 28) 0.25-35 MG-MCG per tablet . Patient states she had to reschedule her appointment and now the pharmacy attached is denying her medication.  
Return call to patient. Patient requests rx be resent to [facility] pharmacy as they are saying they do not have RX on file. RX resent, patient requests brand name yvon Wilfrid GAYATHRI. Order updated.   
21.2

## 2025-02-24 NOTE — ED PROVIDER NOTE - WR ORDER ID 1
Anesthesia Pre Eval Note    Anesthesia ROS/Med Hx        Anesthetic Complication History:    Patient does not have a history of anesthetic complications      Pulmonary Review:  Patient does not have a pulmonary history      Neuro/Psych Review:       Positive for TIA    Cardiovascular Review:   Exercise tolerance: good (>4 METS)  Negative for CHF  Negative for past MI  Positive for hypertension  Positive for hyperlipidemia    GI/HEPATIC/RENAL Review:  Patient does not have a GI/hepatic/renalhistory       End/Other Review:  Patient does not have an endo/other history  Comments: Hb 12.4 at outside facility on 1/28/25  Additional Results:      ALLERGIES:  No Known Allergies   No results found for: \"WBC\", \"RBC\", \"HGB\", \"HCT\", \"MCV\", \"MCH\", \"MCHC\", \"RDWCV\", \"SODIUM\", \"POTASSIUM\", \"CHLORIDE\", \"CO2\", \"GLUCOSE\", \"BUN\", \"CREATININE\", \"GFRESTIMATE\", \"EGFRNONAFR\", \"GFRA\", \"GFRNA\", \"CALCIUM\", \"HCG\", \"PLT\", \"PTT\", \"INR\"   Past Medical History:  No date: Fracture      Comment:  back  No date: Gout  No date: High blood pressure  No date: High cholesterol  No date: Malignant neoplasm  (CMD)      Comment:  skin cancer  Past Surgical History:  No date: Anes hysterectomy  No date: Back surgery      Comment:  fractures   6/2024 8/2024  No date: Bladder repair      Comment:  30 yr ago  07/15/2004: Knee arthroscopy w/ laser; Right      Comment:  knee scope  No date: Lipoma resection  No date: Open access colonoscopy      Comment:  yrs ago  No date: Removal gallbladder  No date: Remv 2nd cataract,corn-scler sectn; Bilateral  No date: Skin cancer excision      Comment:  removed from leg and nose   Prior to Admission medications :  Medication estradiol (ESTRACE) 0.1 MG/GM vaginal cream, Sig Apply topically daily., Start Date 1/17/25, End Date , Taking? Yes, Authorizing Provider Provider, Outside    Medication potassium chloride (KLOR-CON) 10 MEQ ER tablet, Sig Take 1 tablet by mouth every other day., Start Date 2/12/25, End Date , Taking?  Yes, Authorizing Provider Provider, Outside    Medication oxyCODONE, IMM REL, (ROXICODONE) 5 MG immediate release tablet, Sig Take 5-10 mg by mouth in the morning and 5-10 mg at noon and 5-10 mg in the evening., Start Date 7/8/24, End Date , Taking? Yes, Authorizing Provider Provider, Outside    Medication amLODIPine (NORVASC) 5 MG tablet, Sig Take 5 mg by mouth daily., Start Date 1/30/24, End Date , Taking? Yes, Authorizing Provider Provider, Outside    Medication meloxicam (MOBIC) 7.5 MG tablet, Sig Take 1 tablet by mouth in the morning and 1 tablet in the evening., Start Date 12/29/23, End Date , Taking? Yes, Authorizing Provider Provider, Outside    Medication pravastatin (PRAVACHOL) 40 MG tablet, Sig Take 40 mg by mouth daily., Start Date 1/30/24, End Date , Taking? Yes, Authorizing Provider Provider, Outside    Medication allopurinol (ZYLOPRIM) 100 MG tablet, Sig Take 100 mg by mouth daily., Start Date , End Date , Taking? Yes, Authorizing Provider Provider, Outside    Medication esomeprazole (NEXIUM) 40 MG capsule, Sig Take 40 mg by mouth daily (before breakfast)., Start Date , End Date , Taking? Yes, Authorizing Provider Provider, Outside    Medication Multiple Vitamins-Minerals (CENTRUM SILVER) tablet, Sig Take 1 tablet by mouth daily., Start Date , End Date , Taking? Yes, Authorizing Provider Provider, Outside    Medication acetaminophen (TYLENOL) 500 MG tablet, Sig Take 2 tablets by mouth every 8 hours., Start Date 5/14/24, End Date , Taking? , Authorizing Provider Provider, Outside    Medication betamethasone dipropionate augmented (DIPROLENE) 0.05 % ointment, Sig APPLY THIN LAYER TOPICALLY TO VULVA EVERY NIGHT, Start Date 4/22/24, End Date , Taking? , Authorizing Provider Provider, Outside    Medication naLOXone (NARCAN) 4 MG/0.1ML nasal liquid, Sig Spray 4 mg in each nostril., Start Date 5/14/24, End Date , Taking? , Authorizing Provider Provider, Outside    Medication polyethylene glycol (MIRALAX) 17  g packet, Sig Take 17 g by mouth daily., Start Date 5/14/24, End Date , Taking? , Authorizing Provider Provider, Outside         Patient Vitals for the past 24 hrs:   BP Temp Temp src Pulse Resp SpO2 Height Weight   02/24/25 0802 (!) 160/79 36.9 °C (98.5 °F) Temporal (!) 101 20 95 % 5' 5\" (1.651 m) 72.6 kg (160 lb)       Social history reviewed:  Social History     Tobacco Use   Smoking Status Never   Smokeless Tobacco Never        E-Cigarette/Vaping Substances & Devices    E-Cigarette/Vaping Use Never Used        Social History     Substance and Sexual Activity   Alcohol Use Yes    Alcohol/week: 6.0 - 7.0 standard drinks of alcohol    Types: 6 - 7 Shots of liquor per week    Comment: vodka-occ           Relevant Problems   No relevant active problems       Physical Exam     Airway   Mallampati: II  TM Distance: >3 FB  Neck ROM: Full    Cardiovascular  Cardiovascular exam normal    General Assessment  General Assessment: Alert and oriented and No acute distress    Dental Exam    Patient has:  Upper dentures and Lower dentures    Pulmonary Exam  Pulmonary exam normal      Anesthesia Plan:    ASA Status: 3  Anesthesia Type: General    Induction: Intravenous  Maintenance: Inhalational    Post-op Pain Management: Single Shot Block      Checklist  Reviewed: Anesthesia Record, Medications, Allergies, Problem list, Past Med History and Lab Results  Consent/Risks Discussed Statement:  The proposed anesthetic plan, including its risks and benefits, have been discussed with the Patient along with the risks and benefits of alternatives. Questions were encouraged and answered and the patient and/or representative understands and agrees to proceed.    I have discussed elements of the patient's history or examination, as noted above and/or as follows, that place the patient at higher risk of complications; age and neurological disease.    I discussed with the patient (and/or patient's legal representative) the risks and benefits  of the proposed anesthesia plan, General, which may include services performed by other anesthesia providers.    Alternative anesthesia plans, if available, were reviewed with the patient (and/or patient's legal representative). Discussion has been held with the patient (and/or patient's legal representative) regarding risks of anesthesia, which include allergic reaction, anxiety, aspiration, vomiting, nausea, depressed breathing, hypotension, oral injury, organ damage, dental injury, intra-operative awareness, post-op intubation, conversion to general anesthesia, eye injury, nerve injury, bleeding/hematoma and infection and emergent situations that may require change in anesthesia plan.    The patient (and/or patient's legal representative) has indicated understanding, his/her questions have been answered, and he/she wishes to proceed with the planned anesthetic.    Blood Products: Not Anticipated     [id number]

## (undated) DEVICE — PACK GENERAL LAPAROSCOPY

## (undated) DEVICE — WARMING BLANKET UPPER ADULT

## (undated) DEVICE — FOR-ESU VALLEYLAB T7E15008DX: Type: DURABLE MEDICAL EQUIPMENT

## (undated) DEVICE — BASIN AMBULATORY

## (undated) DEVICE — DRSG TELFA 3 X 8

## (undated) DEVICE — SUT POLYSORB 4-0 27" P-12 UNDYED

## (undated) DEVICE — D HELP - CLEARVIEW CLEARIFY SYSTEM

## (undated) DEVICE — DRAPE 1/2 SHEET 40X57"

## (undated) DEVICE — TUBING SUCTION 20FT

## (undated) DEVICE — TROCAR APPLIED MEDICAL KII BALLOON BLUNT TIP 12MM X 130MM

## (undated) DEVICE — SOL IRR POUR NS 0.9% 500ML

## (undated) DEVICE — NDL HYPO SAFE 25G X 1.5" (ORANGE)

## (undated) DEVICE — FOR-ESU VALLEYLAB T7E14840DX: Type: DURABLE MEDICAL EQUIPMENT

## (undated) DEVICE — BLADE SURGICAL #15 CARBON

## (undated) DEVICE — SOL IRR POUR H2O 250ML

## (undated) DEVICE — SOL IRR POUR NS 0.9% 1500ML

## (undated) DEVICE — TUBING STRYKER PNEUMOCLEAR SMOKE EVACUATION HIGH FLOW

## (undated) DEVICE — TROCAR COVIDIEN VERSAONE BLADED SMOOTH 12MM STANDARD

## (undated) DEVICE — WARMING BLANKET LOWER ADULT

## (undated) DEVICE — ELCTR GROUNDING PAD ADULT COVIDIEN

## (undated) DEVICE — VENODYNE/SCD SLEEVE CALF MEDIUM

## (undated) DEVICE — LAP PAD 18 X 18"

## (undated) DEVICE — LIGASURE BLUNT TIP 37CM

## (undated) DEVICE — STAPLER COVIDIEN ENDO GIA STANDARD HANDLE

## (undated) DEVICE — VISITEC 4X4

## (undated) DEVICE — GLV 7.5 PROTEXIS (WHITE)

## (undated) DEVICE — SPECIMEN CONTAINER 100ML

## (undated) DEVICE — ENDOCATCH 10MM SPECIMEN POUCH

## (undated) DEVICE — GLV 7 PROTEXIS (BLUE)

## (undated) DEVICE — GLV 6.5 PROTEXIS (WHITE)

## (undated) DEVICE — Device

## (undated) DEVICE — NDL HYPO REGULAR BEVEL 18GA X 1.5" (PINK)

## (undated) DEVICE — INSUFFLATION NDL COVIDIEN SURGINEEDLE VERESS 120MM

## (undated) DEVICE — SUT POLYSORB 0 30" GU-46

## (undated) DEVICE — TUBING STRYKEFLOW II SUCTION / IRRIGATOR

## (undated) DEVICE — SUT HISTOACRYL BLUE

## (undated) DEVICE — TROCAR COVIDIEN VERSAPORT BLADELESS OPTICAL 5MM STANDARD

## (undated) DEVICE — MARKING PEN W RULER

## (undated) DEVICE — POSITIONER FOAM EGG CRATE ULNAR 2PCS (PINK)

## (undated) DEVICE — MEDICATION LABELS W MARKER

## (undated) DEVICE — DRAPE LIGHT HANDLE COVER (BLUE)

## (undated) DEVICE — SUCTION YANKAUER NO CONTROL VENT